# Patient Record
Sex: FEMALE | Race: BLACK OR AFRICAN AMERICAN | Employment: FULL TIME | URBAN - METROPOLITAN AREA
[De-identification: names, ages, dates, MRNs, and addresses within clinical notes are randomized per-mention and may not be internally consistent; named-entity substitution may affect disease eponyms.]

---

## 2017-07-11 PROBLEM — R73.01 IMPAIRED FASTING BLOOD SUGAR: Status: ACTIVE | Noted: 2017-07-11

## 2017-10-24 PROBLEM — E01.0 THYROMEGALY: Status: ACTIVE | Noted: 2017-10-24

## 2017-10-27 ENCOUNTER — HOSPITAL ENCOUNTER (OUTPATIENT)
Dept: ULTRASOUND IMAGING | Age: 37
Discharge: HOME | End: 2017-10-27
Attending: INTERNAL MEDICINE
Payer: COMMERCIAL

## 2017-10-27 DIAGNOSIS — E01.0 THYROMEGALY: ICD-10-CM

## 2017-10-27 PROCEDURE — 76536 US EXAM OF HEAD AND NECK: CPT

## 2017-11-02 PROBLEM — E04.1 THYROID NODULE: Status: ACTIVE | Noted: 2017-11-02

## 2017-11-08 ENCOUNTER — HOSPITAL ENCOUNTER (OUTPATIENT)
Dept: ULTRASOUND IMAGING | Age: 37
Discharge: HOME | End: 2017-11-08
Attending: INTERNAL MEDICINE
Payer: COMMERCIAL

## 2017-11-08 VITALS
SYSTOLIC BLOOD PRESSURE: 136 MMHG | RESPIRATION RATE: 20 BRPM | HEART RATE: 76 BPM | BODY MASS INDEX: 25.9 KG/M2 | DIASTOLIC BLOOD PRESSURE: 78 MMHG | WEIGHT: 165 LBS | OXYGEN SATURATION: 96 % | HEIGHT: 67 IN | TEMPERATURE: 98 F

## 2017-11-08 DIAGNOSIS — E04.1 THYROID NODULE: ICD-10-CM

## 2017-11-08 PROCEDURE — 88173 CYTOPATH EVAL FNA REPORT: CPT | Performed by: INTERNAL MEDICINE

## 2017-11-08 PROCEDURE — 10022 US GUIDE FINE NDL ASP THYROID: CPT

## 2017-11-08 PROCEDURE — 60100 BIOPSY OF THYROID: CPT

## 2017-11-08 PROCEDURE — 88305 TISSUE EXAM BY PATHOLOGIST: CPT | Performed by: INTERNAL MEDICINE

## 2017-11-08 PROCEDURE — 74011000250 HC RX REV CODE- 250: Performed by: RADIOLOGY

## 2017-11-08 RX ORDER — LIDOCAINE HYDROCHLORIDE 20 MG/ML
20-200 INJECTION, SOLUTION INFILTRATION; PERINEURAL ONCE
Status: COMPLETED | OUTPATIENT
Start: 2017-11-08 | End: 2017-11-08

## 2017-11-08 RX ADMIN — LIDOCAINE HYDROCHLORIDE 100 MG: 20 INJECTION, SOLUTION INFILTRATION; PERINEURAL at 13:21

## 2017-11-08 NOTE — IP AVS SNAPSHOT
Mike Duqueier 
 
 
 [address] [facility] [address] [facility] 
[phone] Patient: Emily Nieto MRN: [id number] Q:0/3/8131 My Medications ASK your physician about these medications Instructions Each Dose to Equal  
 Morning Noon Evening Bedtime  
 albuterol 90 mcg/actuation inhaler Commonly known as:  PROVENTIL HFA, VENTOLIN HFA, PROAIR HFA Your last dose was: Your next dose is: Take 1 Puff by inhalation every six (6) hours as needed for Wheezing. 1 Puff  
    
   
   
   
  
 azithromycin 250 mg tablet Commonly known as:  Kendra Zurdo Your last dose was: Your next dose is: Take 1 Tab by mouth See Admin Instructions for 5 days. 250 mg  
    
   
   
   
  
 budesonide-formoterol 160-4.5 mcg/actuation Hfaa Commonly known as:  SYMBICORT Your last dose was: Your next dose is: Take 2 Puffs by inhalation two (2) times a day. 2 Puff  
    
   
   
   
  
 montelukast 10 mg tablet Commonly known as:  SINGULAIR Your last dose was: Your next dose is: Take 1 Tab by mouth daily. 10 mg  
    
   
   
   
  
 norelgestromin-ethinyl estradiol 150-35 mcg/24 hr  
Commonly known as:  Daniele Rowan Your last dose was: Your next dose is:    
   
   
 1 Patch by TransDERmal route Every Saturday. 1 Patch

## 2017-11-08 NOTE — PROGRESS NOTES
Recovery period without difficulty. Pt alert and oriented and denies pain. Dressing is clean, dry, and intact. Reviewed discharge instructions with patient and mother, both verbalized understanding. [facility]. Vital signs and Royal score completed.

## 2017-11-08 NOTE — IP AVS SNAPSHOT
[address] 
[phone] Patient: Amber Dolan MRN: [id number] TQL:2/5/9229 About your hospitalization You were admitted on:  November 8, 2017 You last received care in the:  [facility]Y [facility] You were discharged on:  November 8, 2017 Why you were hospitalized Your primary diagnosis was:  Not on File Things You Need To Do (next 8 weeks) Monday Dec 04, 2017 LAB with [facility] at  9:45 AM  
Where:  [address] [place] [address] ([address] [place] [address]) Monday Dec 11, 2017 Follow Up with Eliseo Garcia MD at  8:30 AM  
Where:  [address] [place] [address] ([address] [place] [address]) Discharge Orders None A check mera indicates which time of day the medication should be taken. My Medications ASK your physician about these medications Instructions Each Dose to Equal  
 Morning Noon Evening Bedtime  
 albuterol 90 mcg/actuation inhaler Commonly known as:  PROVENTIL HFA, VENTOLIN HFA, PROAIR HFA Your last dose was: Your next dose is: Take 1 Puff by inhalation every six (6) hours as needed for Wheezing. 1 Puff  
    
   
   
   
  
 azithromycin 250 mg tablet Commonly known as:  Archani Lever Your last dose was: Your next dose is: Take 1 Tab by mouth See Admin Instructions for 5 days. 250 mg  
    
   
   
   
  
 budesonide-formoterol 160-4.5 mcg/actuation Hfaa Commonly known as:  SYMBICORT Your last dose was: Your next dose is: Take 2 Puffs by inhalation two (2) times a day. 2 Puff  
    
   
   
   
  
 montelukast 10 mg tablet Commonly known as:  SINGULAIR Your last dose was: Your next dose is: Take 1 Tab by mouth daily.   
 10 mg  
    
   
   
   
  
 norelgestromin-ethinyl estradiol 150-35 mcg/24 hr  
Commonly known as:  April Orozco Your last dose was: Your next dose is:    
   
   
 1 Patch by TransDERmal route Every Saturday. 1 Patch Discharge Instructions [address] Department of Interventional Radiology [facility] 
[phone] Office 
[phone] Fax BIOPSY DISCHARGE INSTRUCTIONS General Instructions: A biopsy is the removal of a small piece of tissue for microscopic examination or testing. Healthy tissue can be obtained for the purpose of tissue-type matching for transplants. Unhealthy tissues are more commonly biopsied to diagnose disease. Lung Biopsy: A needle lung biopsy is performed when there is a mass discovered in the lung area. The most serious risk is infection, bleeding, and pneumothorax (a collapsed lung). Signs of pneumothorax include shortness of breath, rapid heart rate, and blueness of the skin. If any of these occur, call 911. Liver Biopsy: This test helps detect cancer, infections, and the cause of an enlargement of the liver or elevated liver enzymes. It also helps to diagnose a number of liver diseases. The pain associated with the procedure may be felt in the shoulder. The risks include internal bleeding, pneumothorax, and injury to the surrounding organs. Renal Biopsy: This procedure is sometimes done to evaluate a transplanted kidney. It is also used to evaluate unexplained decrease in kidney function, blood, or protein in the urine. You may see bright red blood in the urine the first 24 hours after the test. If the bleeding lasts longer, you need to call your doctor. There is a risk of infection and bleeding into the muscle, which may cause soreness. Spinal Biopsy: This test is sometimes done in conjunction with other procedures.  Your back will be sore, as we are taking a small sample of bone, which is slightly more difficult to sample than tissue. General Biopsy: 
   A mass can grow in any area of the body, and we are taking a specimen as ordered by your doctor. The risks are the same. They include bleeding, pain, and infection. Home Care Instructions: You may resume your regular diet and medication regimen. Do not drink alcohol, drive, or make any important legal decisions in the next 24 hours. Do not lift anything heavier than a gallon of milk until the soreness goes away. You may use over the counter acetaminophen or ibuprofen for the soreness. You may apply an ice pack to the affected area for 20-30 minutes at time for the first 24 hours. After that, you may apply a heat pack. Call If: You should call your Physician and/or the Radiology Nurse if you have any questions or concerns about the biopsy site. Call if you should have increased pain, fever, redness, drainage, or bleeding more than a small spot on the bandage. Follow-Up Instructions: Please see your ordering doctor as he/she has requested. Interventional Radiology General Nurse Discharge After general anesthesia or intravenous sedation, for 24 hours or while taking prescription Narcotics: · Limit your activities · Do not drive and operate hazardous machinery · Do not make important personal or business decisions · Do  not drink alcoholic beverages · If you have not urinated within 8 hours after discharge, please contact your surgeon on call. * Please give a list of your current medications to your Primary Care Provider. * Please update this list whenever your medications are discontinued, doses are 
   changed, or new medications (including over-the-counter products) are added. * Please carry medication information at all times in case of emergency situations. These are general instructions for a healthy lifestyle: No smoking/ No tobacco products/ Avoid exposure to second hand smoke Surgeon General's Warning:  Quitting smoking now greatly reduces serious risk to your health. Obesity, smoking, and sedentary lifestyle greatly increases your risk for illness A healthy diet, regular physical exercise & weight monitoring are important for maintaining a healthy lifestyle You may be retaining fluid if you have a history of heart failure or if you experience any of the following symptoms:  Weight gain of 3 pounds or more overnight or 5 pounds in a week, increased swelling in our hands or feet or shortness of breath while lying flat in bed. Please call your doctor as soon as you notice any of these symptoms; do not wait until your next office visit. Recognize signs and symptoms of STROKE: 
F-face looks uneven A-arms unable to move or move unevenly S-speech slurred or non-existent T-time-call 911 as soon as signs and symptoms begin-DO NOT go Back to bed or wait to see if you get better-TIME IS BRAIN. To Reach UsIf you have any questions about your procedure, please call the Interventional Radiology department at [phone]. After business hours (5pm) and weekends, call the answering service at [phone] and ask for the Radiologist on call to be paged. Si tiene Preguntas acerca del procedimiento, por favor llame al departamento de Radiología Intervencional al [phone]. Después de horas de oficina (5 pm) y los fines de [place], llamar al Matt Shanks de llamadas al [phone] y pregunte por el Radiologo de [facility]. Patient Signature: 
Date: 11/8/2017 Discharging Nurse: Raymundo Amado RN Introducing [facility]! Dear Osorio Chowdhury: Thank you for requesting a [employer] account. Our records indicate that you already have an active [employer] account. You can access your account anytime at [email or web address] [email or web address] 101 Did you know that you can access your hospital and ER discharge instructions at any time in Spokeable? You can also review all of your test results from your hospital stay or ER visit. Additional Information If you have questions, please visit the Frequently Asked Questions section of the Spokeable website at [email or web address] Remember, [employer] is NOT to be used for urgent needs. For medical emergencies, dial 911. Now available from your iPhone and Android! Unresulted Labs-Please follow up with your PCP about these lab tests Order Current Status US GUIDE BX THYROID PERC NDL In process Providers Seen During Your Hospitalization Provider Specialty Primary office phone Fe Lizarraga MD Internal Medicine [phone] Your Primary Care Physician (PCP) Primary Care Physician Office Phone Office Fax Kayla Ortiz [phone] You are allergic to the following Allergen Reactions Other Medication Other (comments) Given here in hospital & face twitched & arms shake  Started w/ R pt thinks Penicillin G Hives Recent Documentation Height Weight BMI OB Status Smoking Status 1.702 m 74.8 kg 25.84 kg/m2 Having regular periods Never Smoker Emergency Contacts Name Discharge Info Relation Home Work Mobile Jareth Estrada  Parent [1] [phone] Jazmin Mccray  Spouse [3] [phone] Patient Belongings The following personal items are in your possession at time of discharge: 
                             
 
  
  
 Please provide this summary of care documentation to your next provider. Signatures-by signing, you are acknowledging that this After Visit Summary has been reviewed with you and you have received a copy. Patient Signature:  ____________________________________________________________ Date:  ____________________________________________________________  
  
Killian [place] Provider Signature:  ____________________________________________________________ Date:  ____________________________________________________________

## 2017-11-08 NOTE — DISCHARGE INSTRUCTIONS
[address]  Department of Interventional Radiology  [facility]  [phone] Office  [phone] Fax    BIOPSY DISCHARGE INSTRUCTIONS    General Instructions:     A biopsy is the removal of a small piece of tissue for microscopic examination or testing. Healthy tissue can be obtained for the purpose of tissue-type matching for transplants. Unhealthy tissues are more commonly biopsied to diagnose disease. Lung Biopsy:     A needle lung biopsy is performed when there is a mass discovered in the lung area. The most serious risk is infection, bleeding, and pneumothorax (a collapsed lung). Signs of pneumothorax include shortness of breath, rapid heart rate, and blueness of the skin. If any of these occur, call 911. Liver Biopsy: This test helps detect cancer, infections, and the cause of an enlargement of the liver or elevated liver enzymes. It also helps to diagnose a number of liver diseases. The pain associated with the procedure may be felt in the shoulder. The risks include internal bleeding, pneumothorax, and injury to the surrounding organs. Renal Biopsy: This procedure is sometimes done to evaluate a transplanted kidney. It is also used to evaluate unexplained decrease in kidney function, blood, or protein in the urine. You may see bright red blood in the urine the first 24 hours after the test. If the bleeding lasts longer, you need to call your doctor. There is a risk of infection and bleeding into the muscle, which may cause soreness. Spinal Biopsy: This test is sometimes done in conjunction with other procedures. Your back will be sore, as we are taking a small sample of bone, which is slightly more difficult to sample than tissue. General Biopsy:     A mass can grow in any area of the body, and we are taking a specimen as ordered by your doctor. The risks are the same.  They include bleeding, pain, and infection. Home Care Instructions: You may resume your regular diet and medication regimen. Do not drink alcohol, drive, or make any important legal decisions in the next 24 hours. Do not lift anything heavier than a gallon of milk until the soreness goes away. You may use over the counter acetaminophen or ibuprofen for the soreness. You may apply an ice pack to the affected area for 20-30 minutes at time for the first 24 hours. After that, you may apply a heat pack. Call If: You should call your Physician and/or the Radiology Nurse if you have any questions or concerns about the biopsy site. Call if you should have increased pain, fever, redness, drainage, or bleeding more than a small spot on the bandage. Follow-Up Instructions: Please see your ordering doctor as he/she has requested. Interventional Radiology General Nurse Discharge    After general anesthesia or intravenous sedation, for 24 hours or while taking prescription Narcotics:  · Limit your activities  · Do not drive and operate hazardous machinery  · Do not make important personal or business decisions  · Do  not drink alcoholic beverages  · If you have not urinated within 8 hours after discharge, please contact your surgeon on call. * Please give a list of your current medications to your Primary Care Provider. * Please update this list whenever your medications are discontinued, doses are     changed, or new medications (including over-the-counter products) are added. * Please carry medication information at all times in case of emergency situations. These are general instructions for a healthy lifestyle:    No smoking/ No tobacco products/ Avoid exposure to second hand smoke  Surgeon General's Warning:  Quitting smoking now greatly reduces serious risk to your health.     Obesity, smoking, and sedentary lifestyle greatly increases your risk for illness  A healthy diet, regular physical exercise & weight monitoring are important for maintaining a healthy lifestyle    You may be retaining fluid if you have a history of heart failure or if you experience any of the following symptoms:  Weight gain of 3 pounds or more overnight or 5 pounds in a week, increased swelling in our hands or feet or shortness of breath while lying flat in bed. Please call your doctor as soon as you notice any of these symptoms; do not wait until your next office visit. Recognize signs and symptoms of STROKE:  F-face looks uneven    A-arms unable to move or move unevenly    S-speech slurred or non-existent    T-time-call 911 as soon as signs and symptoms begin-DO NOT go       Back to bed or wait to see if you get better-TIME IS BRAIN. To Reach UsIf you have any questions about your procedure, please call the Interventional Radiology department at [phone]. After business hours (5pm) and weekends, call the answering service at [phone] and ask for the Radiologist on call to be paged. Si tiene Preguntas acerca del procedimiento, por favor llame al departamento de Radiología Intervencional al [phone]. Después de horas de oficina (5 pm) y los fines de [place], llamar al Juancarlos Morrison de llamadas al [phone] y pregunte por el Radiologo de [facility].           Patient Signature:  Date: 11/8/2017  Discharging Nurse: Ham Moya RN

## 2019-01-25 ENCOUNTER — HOSPITAL ENCOUNTER (OUTPATIENT)
Dept: MAMMOGRAPHY | Age: 39
Discharge: HOME | End: 2019-01-25
Attending: INTERNAL MEDICINE
Payer: COMMERCIAL

## 2019-01-25 DIAGNOSIS — Z12.39 SCREENING BREAST EXAMINATION: ICD-10-CM

## 2019-01-25 PROCEDURE — 77067 SCR MAMMO BI INCL CAD: CPT

## 2019-12-24 ENCOUNTER — HOSPITAL ENCOUNTER (OUTPATIENT)
Dept: ULTRASOUND IMAGING | Age: 39
Discharge: HOME | End: 2019-12-24
Attending: INTERNAL MEDICINE

## 2019-12-24 DIAGNOSIS — E04.1 THYROID NODULE: ICD-10-CM

## 2019-12-26 NOTE — PROGRESS NOTES
Thyroid US looks stable. Previous nodule smaller.   Will follow in 2 yrs and then can probably increase screening interval.

## 2020-03-02 ENCOUNTER — HOSPITAL ENCOUNTER (OUTPATIENT)
Dept: MRI IMAGING | Age: 40
Discharge: HOME | End: 2020-03-02
Attending: INTERNAL MEDICINE
Payer: COMMERCIAL

## 2020-03-02 DIAGNOSIS — M54.2 NECK PAIN: ICD-10-CM

## 2020-03-02 PROCEDURE — 72141 MRI NECK SPINE W/O DYE: CPT

## 2020-04-01 ENCOUNTER — HOSPITAL ENCOUNTER (OUTPATIENT)
Dept: SURGERY | Age: 40
Discharge: HOME | End: 2020-04-01
Payer: COMMERCIAL

## 2020-04-01 VITALS
BODY MASS INDEX: 25.94 KG/M2 | DIASTOLIC BLOOD PRESSURE: 69 MMHG | WEIGHT: 165.25 LBS | RESPIRATION RATE: 18 BRPM | TEMPERATURE: 98 F | HEIGHT: 67 IN | SYSTOLIC BLOOD PRESSURE: 131 MMHG | HEART RATE: 79 BPM | OXYGEN SATURATION: 99 %

## 2020-04-01 LAB
ANION GAP SERPL CALC-SCNC: 4 MMOL/L (ref 7–16)
APPEARANCE UR: CLEAR
BACTERIA SPEC CULT: NORMAL
BASOPHILS # BLD: 0 K/UL (ref 0–0.2)
BASOPHILS NFR BLD: 1 % (ref 0–2)
BILIRUB UR QL: NEGATIVE
BUN SERPL-MCNC: 9 MG/DL (ref 6–23)
CALCIUM SERPL-MCNC: 8.5 MG/DL (ref 8.3–10.4)
CHLORIDE SERPL-SCNC: 108 MMOL/L (ref 98–107)
CO2 SERPL-SCNC: 27 MMOL/L (ref 21–32)
COLOR UR: YELLOW
CREAT SERPL-MCNC: 0.71 MG/DL (ref 0.6–1)
DIFFERENTIAL METHOD BLD: ABNORMAL
EOSINOPHIL # BLD: 0.1 K/UL (ref 0–0.8)
EOSINOPHIL NFR BLD: 1 % (ref 0.5–7.8)
ERYTHROCYTE [DISTWIDTH] IN BLOOD BY AUTOMATED COUNT: 15.5 % (ref 11.9–14.6)
GLUCOSE SERPL-MCNC: 93 MG/DL (ref 65–100)
GLUCOSE UR STRIP.AUTO-MCNC: NEGATIVE MG/DL
HCT VFR BLD AUTO: 39.7 % (ref 35.8–46.3)
HGB BLD-MCNC: 12.6 G/DL (ref 11.7–15.4)
HGB UR QL STRIP: NEGATIVE
IMM GRANULOCYTES # BLD AUTO: 0 K/UL (ref 0–0.5)
IMM GRANULOCYTES NFR BLD AUTO: 0 % (ref 0–5)
KETONES UR QL STRIP.AUTO: NEGATIVE MG/DL
LEUKOCYTE ESTERASE UR QL STRIP.AUTO: NEGATIVE
LYMPHOCYTES # BLD: 2.7 K/UL (ref 0.5–4.6)
LYMPHOCYTES NFR BLD: 43 % (ref 13–44)
MCH RBC QN AUTO: 25.4 PG (ref 26.1–32.9)
MCHC RBC AUTO-ENTMCNC: 31.7 G/DL (ref 31.4–35)
MCV RBC AUTO: 80 FL (ref 79.6–97.8)
MONOCYTES # BLD: 0.4 K/UL (ref 0.1–1.3)
MONOCYTES NFR BLD: 7 % (ref 4–12)
NEUTS SEG # BLD: 3.1 K/UL (ref 1.7–8.2)
NEUTS SEG NFR BLD: 49 % (ref 43–78)
NITRITE UR QL STRIP.AUTO: NEGATIVE
NRBC # BLD: 0 K/UL (ref 0–0.2)
PH UR STRIP: 6 [PH] (ref 5–9)
PLATELET # BLD AUTO: 339 K/UL (ref 150–450)
PMV BLD AUTO: 9.1 FL (ref 9.4–12.3)
POTASSIUM SERPL-SCNC: 4.2 MMOL/L (ref 3.5–5.1)
PROT UR STRIP-MCNC: NEGATIVE MG/DL
RBC # BLD AUTO: 4.96 M/UL (ref 4.05–5.2)
SERVICE CMNT-IMP: NORMAL
SODIUM SERPL-SCNC: 139 MMOL/L (ref 136–145)
SP GR UR REFRACTOMETRY: 1.01 (ref 1–1.02)
UROBILINOGEN UR QL STRIP.AUTO: 0.2 EU/DL (ref 0.2–1)
WBC # BLD AUTO: 6.3 K/UL (ref 4.3–11.1)

## 2020-04-01 PROCEDURE — 93005 ELECTROCARDIOGRAM TRACING: CPT | Performed by: ANESTHESIOLOGY

## 2020-04-01 PROCEDURE — 80048 BASIC METABOLIC PNL TOTAL CA: CPT

## 2020-04-01 PROCEDURE — 87641 MR-STAPH DNA AMP PROBE: CPT

## 2020-04-01 PROCEDURE — 81003 URINALYSIS AUTO W/O SCOPE: CPT

## 2020-04-01 PROCEDURE — 85025 COMPLETE CBC W/AUTO DIFF WBC: CPT

## 2020-04-01 NOTE — PERIOP NOTES
Recent Results (from the past 12 hour(s))   CBC WITH AUTOMATED DIFF    Collection Time: 04/01/20  9:33 AM   Result Value Ref Range    WBC 6.3 4.3 - 11.1 K/uL    RBC 4.96 4.05 - 5.2 M/uL    HGB 12.6 11.7 - 15.4 g/dL    HCT 39.7 35.8 - 46.3 %    MCV 80.0 79.6 - 97.8 FL    MCH 25.4 (L) 26.1 - 32.9 PG    MCHC 31.7 31.4 - 35.0 g/dL    RDW 15.5 (H) 11.9 - 14.6 %    PLATELET 450 850 - 678 K/uL    MPV 9.1 (L) 9.4 - 12.3 FL    ABSOLUTE NRBC 0.00 0.0 - 0.2 K/uL    DF AUTOMATED      NEUTROPHILS 49 43 - 78 %    LYMPHOCYTES 43 13 - 44 %    MONOCYTES 7 4.0 - 12.0 %    EOSINOPHILS 1 0.5 - 7.8 %    BASOPHILS 1 0.0 - 2.0 %    IMMATURE GRANULOCYTES 0 0.0 - 5.0 %    ABS. NEUTROPHILS 3.1 1.7 - 8.2 K/UL    ABS. LYMPHOCYTES 2.7 0.5 - 4.6 K/UL    ABS. MONOCYTES 0.4 0.1 - 1.3 K/UL    ABS. EOSINOPHILS 0.1 0.0 - 0.8 K/UL    ABS. BASOPHILS 0.0 0.0 - 0.2 K/UL    ABS. IMM.  GRANS. 0.0 0.0 - 0.5 K/UL   METABOLIC PANEL, BASIC    Collection Time: 04/01/20  9:33 AM   Result Value Ref Range    Sodium 139 136 - 145 mmol/L    Potassium 4.2 3.5 - 5.1 mmol/L    Chloride 108 (H) 98 - 107 mmol/L    CO2 27 21 - 32 mmol/L    Anion gap 4 (L) 7 - 16 mmol/L    Glucose 93 65 - 100 mg/dL    BUN 9 6 - 23 MG/DL    Creatinine 0.71 0.6 - 1.0 MG/DL    GFR est AA >60 >60 ml/min/1.73m2    GFR est non-AA >60 >60 ml/min/1.73m2    Calcium 8.5 8.3 - 10.4 MG/DL   URINALYSIS W/ RFLX MICROSCOPIC    Collection Time: 04/01/20  9:33 AM   Result Value Ref Range    Color YELLOW      Appearance CLEAR      Specific gravity 1.011 1.001 - 1.023      pH (UA) 6.0 5.0 - 9.0      Protein NEGATIVE  NEG mg/dL    Glucose NEGATIVE  mg/dL    Ketone NEGATIVE  NEG mg/dL    Bilirubin NEGATIVE  NEG      Blood NEGATIVE  NEG      Urobilinogen 0.2 0.2 - 1.0 EU/dL    Nitrites NEGATIVE  NEG      Leukocyte Esterase NEGATIVE  NEG     MSSA/MRSA SC BY PCR, NASAL SWAB    Collection Time: 04/01/20  9:44 AM   Result Value Ref Range    Special Requests: NO SPECIAL REQUESTS      Culture result:        SA target not detected. A MRSA NEGATIVE, SA NEGATIVE test result does not preclude MRSA or SA nasal colonization.    EKG, 12 LEAD, INITIAL    Collection Time: 04/01/20  9:47 AM   Result Value Ref Range    Ventricular Rate 67 BPM    Atrial Rate 67 BPM    P-R Interval 152 ms    QRS Duration 78 ms    Q-T Interval 398 ms    QTC Calculation (Bezet) 420 ms    Calculated P Axis 66 degrees    Calculated R Axis 68 degrees    Calculated T Axis 39 degrees    Diagnosis       Normal sinus rhythm with sinus arrhythmia  Normal ECG  No previous ECGs available

## 2020-04-01 NOTE — PERIOP NOTES
Patient verified name, , and surgery as listed in [facility]. Patient provided medical/health information and PTA medications to the best of their ability. TYPE  CASE: III  Orders per surgeon: not received  Labs per surgeon: cbc w/diff, bmp, UA, Mrsa swab  Labs per anesthesia protocol: t&S dos  EKG: done today and acceptable per anesthesia protocol     Nasal Swab collected per MD order. Patient provided with and instructed on education handouts including Guide to Surgery, blood transfusions, pain management, and hand hygiene for the family and community, and [facility] brochure. Road to Recovery Spine surgery patient guide given. Patient viewed spine prehab video. Hibiclens and instructions given per hospital policy. Original medication prescription bottles not visualized during patient appointment. Patient teach back successful and patient demonstrates knowledge of instruction.

## 2020-04-01 NOTE — PERIOP NOTES
PLEASE CONTINUE TAKING ALL PRESCRIPTION MEDICATIONS UP TO THE DAY OF SURGERY UNLESS OTHERWISE DIRECTED BELOW. DISCONTINUE all vitamins and supplements 7 days prior to surgery. DISCONTINUE Non-Steriodal Anti-Inflammatory (NSAIDS) such as Advil and Aleve 5 days prior to surgery. Home Medications to take  the day of surgery    Acetaminophen 1000 mg   symbicort   Cetirizine (zyrtec)     Home Medications   to Hold           Comments    On the day before surgery take Acetaminophen 1000mg in the morning and at bedtime       *Visitor policy of 1 visitor per patient discussed. Please do not bring home medications with you on the day of surgery unless otherwise directed by your nurse. If you are instructed to bring home medications, please give them to your nurse as they will be administered by the nursing staff. If you have any questions, please call [place] [phone] or [address] [place] [address] [phone]. A copy of this note was provided to the patient for reference.

## 2020-04-02 LAB
ATRIAL RATE: 67 BPM
CALCULATED P AXIS, ECG09: 66 DEGREES
CALCULATED R AXIS, ECG10: 68 DEGREES
CALCULATED T AXIS, ECG11: 39 DEGREES
DIAGNOSIS, 93000: NORMAL
P-R INTERVAL, ECG05: 152 MS
Q-T INTERVAL, ECG07: 398 MS
QRS DURATION, ECG06: 78 MS
QTC CALCULATION (BEZET), ECG08: 420 MS
VENTRICULAR RATE, ECG03: 67 BPM

## 2020-04-06 NOTE — H&P
Chief complaint: Radiating neck pain. History of present illness: This is a 55-year-old patient that I had seen about 3 weeks ago with symptoms and exam findings suggestive of cervical myelopathy and confounding right-sided lumbar radiculopathy. I had recommended C4-C5 ACDF. Now, the patient returns stating that her symptoms have worsened. She has increased pressure in her neck and has been dropping objects including shampoo, her phone, and a tight going into the often. She has also experienced shocking sensations in all extremities and has noted spasming and clawing of her left hand. Her pain has increased and is 9/10 on the visual analog scale. She has already tried chiropractic care, wrist splints, carpal tunnel injections, gabapentin, and a home exercise program.      PMHx/PSHx/Social History/Medications/Allergies/ROS: are listed separately in the electronic medical record and have been reviewed. ROS:     No fever, chills, or chest pain. ????? Medications: EC-Naprosyn (500 MG, Take 1 tablet(s) by mouth 2 times a day as needed [PRN]); Montelukast Sodium (10 MG); Neurontin (100 MG, 1 q night for 3 nights, then 1 bid for 3 days then 1 TID); ProAir HFA (108 (90... MCG/ACT); Symbicort (160-4.5. Clenton Reas Clenton Reas MCG/ACT); Xulane (150-35 . .. MCG/24HR)  ? ???? Allergies: Penicillin  ?????    Physical Exam: This is a well developed well nourished adult female in no acute distress. Mood and affect are appropriate. Oriented to person, place, and time. Head is normocephalic and atraumatic. Extraocular movements intact. Sclera anicteric. Respirations are unlabored and there is no evidence of cyanosis. Chest is clear to auscultation. Heart is regular rate and rhythm. Abdomen is soft. There is subjective light touch sensory loss over the bilateral hands in a glovelike pattern in the right posterior thigh and lateral shin. Spurlings is positive.     The patient ambulates with a normal gait.    Deep tendon reflex testing in the upper extremity reveals the following. Right Left   Biceps (C5) 3 3   Brachio radialis (C6) 3 3    Triceps (C7) 3 3                 Covingtons is positive. Ankle jerk is positive. Inverted radial reflex is positive. Strength testing in the upper extremity reveals the following based on the 5 point grading scale:     Delt(C5) Bicep(C6) WE(C6) Tricep (C7) WF(C7) (C8) Int (T1)   Right 5 5 5 5 4 4 4   Left 5 5 5 5 4 4 4     Pulses are palpable over bilateral radial arteries. Radiographic Studies:    X-rays and MRI were again independently reviewed and correlate with the patients symptoms. Assessment/Plan: This patients clinical history and physical exam suggestive of cervical myelopathy. I believe she has confounding right-sided lumbar radiculopathy. The imaging studies are concordant with the patients symptoms. Her symptoms have worsened over the last couple of weeks. Conservative efforts have been reasonably exhausted and the patient feels like she cannot go on with the symptoms as they are. We discussed that she may have a double crush phenomenon or other contributing factors to her symptoms. However, the cervical stenosis with cord compression is an overlying pathology that will make any of the other pathologies difficult to differentiate. We have previously discussed surgical options and now she would like to proceed with surgical scheduling.    ????? We discussed the details of the surgery including an incision over the left side of the front of the neck. The windpipe and foodpipe would be retracted to the side to expose the underlying spine. The appropriate disc would be identified with an X-ray and the disc would be removed. The nerves would be freed by trimming any impinging structures such as bone spurs and disc material.   The disc space would then be filled with a spacer and bone graft from a cadaver.  A metal plate may be applied to augment the structure. A drain may be placed, and then the wound would be closed with suture and covered with sterile dressings. She would expect to stay in recovery for observation or in the hospital overnight depending on how quickly she recovers. Follow-up would be scheduled for 2-3 weeks and she would have restrictions including no driving for 2 weeks, no lifting greater than 15 lbs. We also discussed the potential risks of the surgery including, but not limited to infection, spinal fluid leak, compressive hematoma; injury to spinal cord or peripheral nerve root resulting in paralysis, or loss of use of an extremity; persistent neck or arm symptoms or pain at the bone graft site; failure of the bone graft to heal or failure of the hardware resulting in the possibility of needing additional surgery; postoperative hoarseness or dysphagia; injury to an artery or vein resulting in significant blood loss; and the risks of anesthesia including, but not limited heart attack, stroke, blood clot or death. The patient was also given a brochure on anterior cervical discectomy and fusion. The patient voiced an understanding of these issues outlined. The procedure that I think may be beneficial here is an anterior cervical discectomy and fusion with interbody spacer, allograft and instrumentation from C4-5.             Electronically Signed By Blair Feliz MD

## 2020-04-07 ENCOUNTER — ANESTHESIA EVENT (OUTPATIENT)
Dept: SURGERY | Age: 40
End: 2020-04-07
Payer: COMMERCIAL

## 2020-04-07 RX ORDER — SODIUM CHLORIDE, SODIUM LACTATE, POTASSIUM CHLORIDE, CALCIUM CHLORIDE 600; 310; 30; 20 MG/100ML; MG/100ML; MG/100ML; MG/100ML
125 INJECTION, SOLUTION INTRAVENOUS CONTINUOUS
Status: CANCELLED | OUTPATIENT
Start: 2020-04-07

## 2020-04-07 RX ORDER — HYDROMORPHONE HYDROCHLORIDE 2 MG/ML
0.5 INJECTION, SOLUTION INTRAMUSCULAR; INTRAVENOUS; SUBCUTANEOUS
Status: CANCELLED | OUTPATIENT
Start: 2020-04-07

## 2020-04-07 RX ORDER — NALOXONE HYDROCHLORIDE 0.4 MG/ML
0.1 INJECTION, SOLUTION INTRAMUSCULAR; INTRAVENOUS; SUBCUTANEOUS AS NEEDED
Status: CANCELLED | OUTPATIENT
Start: 2020-04-07

## 2020-04-07 RX ORDER — CEFAZOLIN SODIUM/WATER 2 G/20 ML
2 SYRINGE (ML) INTRAVENOUS ONCE
Status: CANCELLED | OUTPATIENT
Start: 2020-04-07 | End: 2020-04-07

## 2020-04-07 RX ORDER — OXYCODONE HYDROCHLORIDE 5 MG/1
10 TABLET ORAL
Status: CANCELLED | OUTPATIENT
Start: 2020-04-07 | End: 2020-04-08

## 2020-04-07 NOTE — ANESTHESIA PREPROCEDURE EVALUATION
Relevant Problems   No relevant active problems       Anesthetic History               Review of Systems / Medical History  Patient summary reviewed, nursing notes reviewed and pertinent labs reviewed    Pulmonary            Asthma        Neuro/Psych              Cardiovascular                  Exercise tolerance: >4 METS     GI/Hepatic/Renal                Endo/Other      Hypothyroidism  Obesity     Other Findings            Physical Exam    Airway  Mallampati: II  TM Distance: > 6 cm  Neck ROM: normal range of motion   Mouth opening: Normal     Cardiovascular    Rhythm: regular           Dental    Dentition: Caps/crowns     Pulmonary                 Abdominal  GI exam deferred       Other Findings            Anesthetic Plan    ASA: 2  Anesthesia type: general          Induction: Intravenous  Anesthetic plan and risks discussed with: Patient

## 2020-04-08 ENCOUNTER — APPOINTMENT (OUTPATIENT)
Dept: GENERAL RADIOLOGY | Age: 40
End: 2020-04-08
Attending: ORTHOPAEDIC SURGERY
Payer: COMMERCIAL

## 2020-04-08 ENCOUNTER — ANESTHESIA (OUTPATIENT)
Dept: SURGERY | Age: 40
End: 2020-04-08
Payer: COMMERCIAL

## 2020-04-08 ENCOUNTER — HOSPITAL ENCOUNTER (OUTPATIENT)
Age: 40
Setting detail: OUTPATIENT SURGERY
Discharge: HOME | End: 2020-04-08
Attending: ORTHOPAEDIC SURGERY | Admitting: ORTHOPAEDIC SURGERY
Payer: COMMERCIAL

## 2020-04-08 VITALS
SYSTOLIC BLOOD PRESSURE: 150 MMHG | WEIGHT: 170 LBS | TEMPERATURE: 98 F | DIASTOLIC BLOOD PRESSURE: 94 MMHG | RESPIRATION RATE: 14 BRPM | BODY MASS INDEX: 26.63 KG/M2 | OXYGEN SATURATION: 94 % | HEART RATE: 60 BPM

## 2020-04-08 DIAGNOSIS — M48.02 CERVICAL SPINAL STENOSIS: Primary | ICD-10-CM

## 2020-04-08 PROBLEM — G95.9 CERVICAL MYELOPATHY (HCC): Status: ACTIVE | Noted: 2020-04-08

## 2020-04-08 LAB
ABO + RH BLD: NORMAL
BLOOD GROUP ANTIBODIES SERPL: NORMAL
HCG UR QL: NEGATIVE
SPECIMEN EXP DATE BLD: NORMAL

## 2020-04-08 PROCEDURE — C1713 ANCHOR/SCREW BN/BN,TIS/BN: HCPCS | Performed by: ORTHOPAEDIC SURGERY

## 2020-04-08 PROCEDURE — 77030039425 HC BLD LARYNG TRULITE DISP TELE -A: Performed by: ANESTHESIOLOGY

## 2020-04-08 PROCEDURE — 74011000250 HC RX REV CODE- 250: Performed by: ORTHOPAEDIC SURGERY

## 2020-04-08 PROCEDURE — 77030010507 HC ADH SKN DERMBND J&J -B: Performed by: ORTHOPAEDIC SURGERY

## 2020-04-08 PROCEDURE — 74011250636 HC RX REV CODE- 250/636: Performed by: NURSE ANESTHETIST, CERTIFIED REGISTERED

## 2020-04-08 PROCEDURE — 77030037088 HC TUBE ENDOTRACH ORAL NSL COVD-A: Performed by: ANESTHESIOLOGY

## 2020-04-08 PROCEDURE — 77030003860 HC BIT DRL STRY -B: Performed by: ORTHOPAEDIC SURGERY

## 2020-04-08 PROCEDURE — 77030028270 HC SRGFL HEMSTAT MTRX J&J -C: Performed by: ORTHOPAEDIC SURGERY

## 2020-04-08 PROCEDURE — 74011000250 HC RX REV CODE- 250: Performed by: NURSE ANESTHETIST, CERTIFIED REGISTERED

## 2020-04-08 PROCEDURE — 77030040361 HC SLV COMPR DVT MDII -B: Performed by: ORTHOPAEDIC SURGERY

## 2020-04-08 PROCEDURE — 72020 X-RAY EXAM OF SPINE 1 VIEW: CPT

## 2020-04-08 PROCEDURE — 77030012894: Performed by: ORTHOPAEDIC SURGERY

## 2020-04-08 PROCEDURE — 76060000033 HC ANESTHESIA 1 TO 1.5 HR: Performed by: ORTHOPAEDIC SURGERY

## 2020-04-08 PROCEDURE — 77030018673: Performed by: ORTHOPAEDIC SURGERY

## 2020-04-08 PROCEDURE — 77030011265 HC ELECTRD BLD HEX COVD -A: Performed by: ORTHOPAEDIC SURGERY

## 2020-04-08 PROCEDURE — 77030025623 HC BUR RND PRECIS STRY -D: Performed by: ORTHOPAEDIC SURGERY

## 2020-04-08 PROCEDURE — 77030034475 HC MISC IMPL SPN: Performed by: ORTHOPAEDIC SURGERY

## 2020-04-08 PROCEDURE — 86900 BLOOD TYPING SEROLOGIC ABO: CPT

## 2020-04-08 PROCEDURE — 77030019908 HC STETH ESOPH SIMS -A: Performed by: ANESTHESIOLOGY

## 2020-04-08 PROCEDURE — 76010000161 HC OR TIME 1 TO 1.5 HR INTENSV-TIER 1: Performed by: ORTHOPAEDIC SURGERY

## 2020-04-08 PROCEDURE — 77030039155 HC CGE SPN ANT CERV TRITANIUM STRY -G: Performed by: ORTHOPAEDIC SURGERY

## 2020-04-08 PROCEDURE — 74011250636 HC RX REV CODE- 250/636: Performed by: ORTHOPAEDIC SURGERY

## 2020-04-08 PROCEDURE — 77030018836 HC SOL IRR NACL ICUM -A: Performed by: ORTHOPAEDIC SURGERY

## 2020-04-08 PROCEDURE — 77030003666 HC NDL SPINAL BD -A: Performed by: ORTHOPAEDIC SURGERY

## 2020-04-08 PROCEDURE — 74011250636 HC RX REV CODE- 250/636: Performed by: ANESTHESIOLOGY

## 2020-04-08 PROCEDURE — 81025 URINE PREGNANCY TEST: CPT

## 2020-04-08 PROCEDURE — 74011250637 HC RX REV CODE- 250/637: Performed by: ORTHOPAEDIC SURGERY

## 2020-04-08 PROCEDURE — 77030031139 HC SUT VCRL2 J&J -A: Performed by: ORTHOPAEDIC SURGERY

## 2020-04-08 PROCEDURE — 76210000021 HC REC RM PH II 0.5 TO 1 HR: Performed by: ORTHOPAEDIC SURGERY

## 2020-04-08 PROCEDURE — 77030029099 HC BN WAX SSPC -A: Performed by: ORTHOPAEDIC SURGERY

## 2020-04-08 PROCEDURE — 74011250637 HC RX REV CODE- 250/637: Performed by: ANESTHESIOLOGY

## 2020-04-08 PROCEDURE — 76210000006 HC OR PH I REC 0.5 TO 1 HR: Performed by: ORTHOPAEDIC SURGERY

## 2020-04-08 DEVICE — BIO DBM PUTTY
Type: IMPLANTABLE DEVICE | Site: SPINE CERVICAL | Status: FUNCTIONAL
Brand: BIO DBM

## 2020-04-08 DEVICE — ONE-LEVEL ANTERIOR PLATE
Type: IMPLANTABLE DEVICE | Site: SPINE CERVICAL | Status: FUNCTIONAL
Brand: AVIATOR

## 2020-04-08 DEVICE — ANTERIOR CERVICAL CAGE
Type: IMPLANTABLE DEVICE | Site: SPINE CERVICAL | Status: FUNCTIONAL
Brand: TRITANIUM C

## 2020-04-08 DEVICE — VARIABLE, SELF TAPPING SCREW
Type: IMPLANTABLE DEVICE | Site: SPINE CERVICAL | Status: FUNCTIONAL
Brand: AVIATOR

## 2020-04-08 RX ORDER — ROCURONIUM BROMIDE 10 MG/ML
INJECTION, SOLUTION INTRAVENOUS AS NEEDED
Status: DISCONTINUED | OUTPATIENT
Start: 2020-04-08 | End: 2020-04-08 | Stop reason: HOSPADM

## 2020-04-08 RX ORDER — OXYCODONE HYDROCHLORIDE 5 MG/1
5 TABLET ORAL
Qty: 30 TAB | Refills: 0 | Status: SHIPPED | OUTPATIENT
Start: 2020-04-08 | End: 2020-04-13

## 2020-04-08 RX ORDER — LIDOCAINE HYDROCHLORIDE 10 MG/ML
0.1 INJECTION INFILTRATION; PERINEURAL AS NEEDED
Status: DISCONTINUED | OUTPATIENT
Start: 2020-04-08 | End: 2020-04-08 | Stop reason: HOSPADM

## 2020-04-08 RX ORDER — DEXAMETHASONE SODIUM PHOSPHATE 4 MG/ML
INJECTION, SOLUTION INTRA-ARTICULAR; INTRALESIONAL; INTRAMUSCULAR; INTRAVENOUS; SOFT TISSUE AS NEEDED
Status: DISCONTINUED | OUTPATIENT
Start: 2020-04-08 | End: 2020-04-08 | Stop reason: HOSPADM

## 2020-04-08 RX ORDER — CEFAZOLIN SODIUM/WATER 2 G/20 ML
SYRINGE (ML) INTRAVENOUS AS NEEDED
Status: DISCONTINUED | OUTPATIENT
Start: 2020-04-08 | End: 2020-04-08 | Stop reason: HOSPADM

## 2020-04-08 RX ORDER — PROPOFOL 10 MG/ML
INJECTION, EMULSION INTRAVENOUS AS NEEDED
Status: DISCONTINUED | OUTPATIENT
Start: 2020-04-08 | End: 2020-04-08 | Stop reason: HOSPADM

## 2020-04-08 RX ORDER — HYDROMORPHONE HYDROCHLORIDE 2 MG/ML
0.5 INJECTION, SOLUTION INTRAMUSCULAR; INTRAVENOUS; SUBCUTANEOUS
Status: DISCONTINUED | OUTPATIENT
Start: 2020-04-08 | End: 2020-04-08 | Stop reason: HOSPADM

## 2020-04-08 RX ORDER — ACETAMINOPHEN 500 MG
1000 TABLET ORAL ONCE
Status: DISCONTINUED | OUTPATIENT
Start: 2020-04-08 | End: 2020-04-08 | Stop reason: HOSPADM

## 2020-04-08 RX ORDER — SODIUM CHLORIDE 0.9 % (FLUSH) 0.9 %
5-40 SYRINGE (ML) INJECTION AS NEEDED
Status: DISCONTINUED | OUTPATIENT
Start: 2020-04-08 | End: 2020-04-08 | Stop reason: HOSPADM

## 2020-04-08 RX ORDER — MIDAZOLAM HYDROCHLORIDE 1 MG/ML
2 INJECTION, SOLUTION INTRAMUSCULAR; INTRAVENOUS
Status: DISCONTINUED | OUTPATIENT
Start: 2020-04-08 | End: 2020-04-08 | Stop reason: HOSPADM

## 2020-04-08 RX ORDER — LABETALOL HYDROCHLORIDE 5 MG/ML
INJECTION, SOLUTION INTRAVENOUS AS NEEDED
Status: DISCONTINUED | OUTPATIENT
Start: 2020-04-08 | End: 2020-04-08 | Stop reason: HOSPADM

## 2020-04-08 RX ORDER — METOPROLOL TARTRATE 5 MG/5ML
INJECTION INTRAVENOUS AS NEEDED
Status: DISCONTINUED | OUTPATIENT
Start: 2020-04-08 | End: 2020-04-08 | Stop reason: HOSPADM

## 2020-04-08 RX ORDER — CEFAZOLIN SODIUM/WATER 2 G/20 ML
2 SYRINGE (ML) INTRAVENOUS ONCE
Status: DISCONTINUED | OUTPATIENT
Start: 2020-04-08 | End: 2020-04-08 | Stop reason: HOSPADM

## 2020-04-08 RX ORDER — NEOSTIGMINE METHYLSULFATE 1 MG/ML
INJECTION, SOLUTION INTRAVENOUS AS NEEDED
Status: DISCONTINUED | OUTPATIENT
Start: 2020-04-08 | End: 2020-04-08 | Stop reason: HOSPADM

## 2020-04-08 RX ORDER — ACETAMINOPHEN 500 MG
1000 TABLET ORAL
COMMUNITY

## 2020-04-08 RX ORDER — ONDANSETRON 2 MG/ML
INJECTION INTRAMUSCULAR; INTRAVENOUS AS NEEDED
Status: DISCONTINUED | OUTPATIENT
Start: 2020-04-08 | End: 2020-04-08 | Stop reason: HOSPADM

## 2020-04-08 RX ORDER — SODIUM CHLORIDE 0.9 % (FLUSH) 0.9 %
5-40 SYRINGE (ML) INJECTION EVERY 8 HOURS
Status: DISCONTINUED | OUTPATIENT
Start: 2020-04-08 | End: 2020-04-08 | Stop reason: HOSPADM

## 2020-04-08 RX ORDER — SODIUM CHLORIDE, SODIUM LACTATE, POTASSIUM CHLORIDE, CALCIUM CHLORIDE 600; 310; 30; 20 MG/100ML; MG/100ML; MG/100ML; MG/100ML
75 INJECTION, SOLUTION INTRAVENOUS CONTINUOUS
Status: DISCONTINUED | OUTPATIENT
Start: 2020-04-08 | End: 2020-04-08 | Stop reason: HOSPADM

## 2020-04-08 RX ORDER — FENTANYL CITRATE 50 UG/ML
INJECTION, SOLUTION INTRAMUSCULAR; INTRAVENOUS AS NEEDED
Status: DISCONTINUED | OUTPATIENT
Start: 2020-04-08 | End: 2020-04-08 | Stop reason: HOSPADM

## 2020-04-08 RX ORDER — MIDAZOLAM HYDROCHLORIDE 1 MG/ML
2 INJECTION, SOLUTION INTRAMUSCULAR; INTRAVENOUS
Status: COMPLETED | OUTPATIENT
Start: 2020-04-08 | End: 2020-04-08

## 2020-04-08 RX ORDER — OXYCODONE HYDROCHLORIDE 5 MG/1
5 TABLET ORAL
Status: DISCONTINUED | OUTPATIENT
Start: 2020-04-08 | End: 2020-04-08 | Stop reason: HOSPADM

## 2020-04-08 RX ORDER — LIDOCAINE HYDROCHLORIDE 20 MG/ML
INJECTION, SOLUTION EPIDURAL; INFILTRATION; INTRACAUDAL; PERINEURAL AS NEEDED
Status: DISCONTINUED | OUTPATIENT
Start: 2020-04-08 | End: 2020-04-08 | Stop reason: HOSPADM

## 2020-04-08 RX ORDER — ACETAMINOPHEN 500 MG
1000 TABLET ORAL ONCE
Status: DISCONTINUED | OUTPATIENT
Start: 2020-04-08 | End: 2020-04-08 | Stop reason: SDUPTHER

## 2020-04-08 RX ORDER — SODIUM CHLORIDE, SODIUM LACTATE, POTASSIUM CHLORIDE, CALCIUM CHLORIDE 600; 310; 30; 20 MG/100ML; MG/100ML; MG/100ML; MG/100ML
125 INJECTION, SOLUTION INTRAVENOUS CONTINUOUS
Status: DISCONTINUED | OUTPATIENT
Start: 2020-04-08 | End: 2020-04-08 | Stop reason: HOSPADM

## 2020-04-08 RX ORDER — OXYCODONE AND ACETAMINOPHEN 10; 325 MG/1; MG/1
1 TABLET ORAL AS NEEDED
Status: DISCONTINUED | OUTPATIENT
Start: 2020-04-08 | End: 2020-04-08 | Stop reason: HOSPADM

## 2020-04-08 RX ORDER — GLYCOPYRROLATE 0.2 MG/ML
INJECTION INTRAMUSCULAR; INTRAVENOUS AS NEEDED
Status: DISCONTINUED | OUTPATIENT
Start: 2020-04-08 | End: 2020-04-08 | Stop reason: HOSPADM

## 2020-04-08 RX ADMIN — ONDANSETRON 4 MG: 2 INJECTION INTRAMUSCULAR; INTRAVENOUS at 08:14

## 2020-04-08 RX ADMIN — ROCURONIUM BROMIDE 10 MG: 10 INJECTION, SOLUTION INTRAVENOUS at 07:30

## 2020-04-08 RX ADMIN — GLYCOPYRROLATE 0.4 MG: 0.2 INJECTION, SOLUTION INTRAMUSCULAR; INTRAVENOUS at 08:17

## 2020-04-08 RX ADMIN — FENTANYL CITRATE 50 MCG: 50 INJECTION INTRAMUSCULAR; INTRAVENOUS at 07:40

## 2020-04-08 RX ADMIN — FENTANYL CITRATE 50 MCG: 50 INJECTION INTRAMUSCULAR; INTRAVENOUS at 07:33

## 2020-04-08 RX ADMIN — ROCURONIUM BROMIDE 40 MG: 10 INJECTION, SOLUTION INTRAVENOUS at 07:21

## 2020-04-08 RX ADMIN — Medication 3 AMPULE: at 06:46

## 2020-04-08 RX ADMIN — DEXAMETHASONE SODIUM PHOSPHATE 4 MG: 4 INJECTION, SOLUTION INTRAMUSCULAR; INTRAVENOUS at 07:51

## 2020-04-08 RX ADMIN — METOPROLOL TARTRATE 1 MG: 1 INJECTION, SOLUTION INTRAVENOUS at 08:02

## 2020-04-08 RX ADMIN — OXYCODONE HYDROCHLORIDE AND ACETAMINOPHEN 1 TABLET: 10; 325 TABLET ORAL at 08:49

## 2020-04-08 RX ADMIN — Medication 4 MG: at 08:17

## 2020-04-08 RX ADMIN — LIDOCAINE HYDROCHLORIDE 100 MG: 20 INJECTION, SOLUTION EPIDURAL; INFILTRATION; INTRACAUDAL; PERINEURAL at 07:21

## 2020-04-08 RX ADMIN — FENTANYL CITRATE 100 MCG: 50 INJECTION INTRAMUSCULAR; INTRAVENOUS at 07:44

## 2020-04-08 RX ADMIN — FENTANYL CITRATE 50 MCG: 50 INJECTION INTRAMUSCULAR; INTRAVENOUS at 07:21

## 2020-04-08 RX ADMIN — LABETALOL HYDROCHLORIDE 5 MG: 5 INJECTION INTRAVENOUS at 07:42

## 2020-04-08 RX ADMIN — SODIUM CHLORIDE, SODIUM LACTATE, POTASSIUM CHLORIDE, AND CALCIUM CHLORIDE 125 ML/HR: 600; 310; 30; 20 INJECTION, SOLUTION INTRAVENOUS at 06:46

## 2020-04-08 RX ADMIN — Medication 2 G: at 07:30

## 2020-04-08 RX ADMIN — LABETALOL HYDROCHLORIDE 5 MG: 5 INJECTION INTRAVENOUS at 07:33

## 2020-04-08 RX ADMIN — FENTANYL CITRATE 50 MCG: 50 INJECTION INTRAMUSCULAR; INTRAVENOUS at 07:28

## 2020-04-08 RX ADMIN — PROPOFOL 200 MG: 10 INJECTION, EMULSION INTRAVENOUS at 07:21

## 2020-04-08 RX ADMIN — MIDAZOLAM 2 MG: 1 INJECTION INTRAMUSCULAR; INTRAVENOUS at 07:02

## 2020-04-08 NOTE — ANESTHESIA POSTPROCEDURE EVALUATION
Procedure(s):  C4-C5  ANTERIOR CERVICAL DISCECTOMY AND FUSION WITH INTERBODY SPACER, ALLOGRAFT AND INSTRUMENTATION.     general    Anesthesia Post Evaluation      Multimodal analgesia: multimodal analgesia used between 6 hours prior to anesthesia start to PACU discharge  Patient location during evaluation: PACU  Patient participation: complete - patient participated  Level of consciousness: awake  Pain management: adequate  Airway patency: patent  Anesthetic complications: no  Cardiovascular status: acceptable  Respiratory status: acceptable  Hydration status: acceptable  Post anesthesia nausea and vomiting:  none      Vitals Value Taken Time   /94 4/8/2020  9:10 AM   Temp 36.7 °C (98 °F) 4/8/2020  9:10 AM   Pulse 60 4/8/2020  9:11 AM   Resp 14 4/8/2020  9:11 AM   SpO2 94 % 4/8/2020  9:11 AM

## 2020-04-08 NOTE — PROGRESS NOTES
's Pre-op visit requested by patient. Conveyed care and concern for patient and family. Offered prayer as requested for patient, family, and staff.     Indigo Aguilar MDiv, BS  Board Certified

## 2020-04-08 NOTE — DISCHARGE INSTRUCTIONS
After general anesthesia or intravenous sedation, for 24 hours or while taking prescription Narcotics:  · Limit your activities  · A responsible adult needs to be with you for the next 24 hours  · Do not drive and operate hazardous machinery  · Do not make important personal or business decisions  · Do not drink alcoholic beverages  · If you have not urinated within 8 hours after discharge, please contact your surgeon on call. · If you have sleep apnea and have a CPAP machine, please use it for all naps and sleeping. · Please use caution when taking narcotics and any of your home medications that may cause drowsiness. *  Please give a list of your current medications to your Primary Care Provider. *  Please update this list whenever your medications are discontinued, doses are      changed, or new medications (including over-the-counter products) are added. *  Please carry medication information at all times in case of emergency situations. These are general instructions for a healthy lifestyle:  No smoking/ No tobacco products/ Avoid exposure to second hand smoke  Surgeon General's Warning:  Quitting smoking now greatly reduces serious risk to your health. Obesity, smoking, and sedentary lifestyle greatly increases your risk for illness  A healthy diet, regular physical exercise & weight monitoring are important for maintaining a healthy lifestyle    You may be retaining fluid if you have a history of heart failure or if you experience any of the following symptoms:  Weight gain of 3 pounds or more overnight or 5 pounds in a week, increased swelling in our hands or feet or shortness of breath while lying flat in bed. Please call your doctor as soon as you notice any of these symptoms; do not wait until your next office visit.             After general anesthesia or intravenous sedation, for 24 hours or while taking prescription Narcotics:  · Limit your activities  · A responsible adult needs to be with you for the next 24 hours  · Do not drive and operate hazardous machinery  · Do not make important personal or business decisions  · Do not drink alcoholic beverages  · If you have not urinated within 8 hours after discharge, please contact your surgeon on call. · If you have sleep apnea and have a CPAP machine, please use it for all naps and sleeping. · Please use caution when taking narcotics and any of your home medications that may cause drowsiness. *  Please give a list of your current medications to your Primary Care Provider. *  Please update this list whenever your medications are discontinued, doses are      changed, or new medications (including over-the-counter products) are added. *  Please carry medication information at all times in case of emergency situations. These are general instructions for a healthy lifestyle:  No smoking/ No tobacco products/ Avoid exposure to second hand smoke  Surgeon General's Warning:  Quitting smoking now greatly reduces serious risk to your health. Obesity, smoking, and sedentary lifestyle greatly increases your risk for illness  A healthy diet, regular physical exercise & weight monitoring are important for maintaining a healthy lifestyle    You may be retaining fluid if you have a history of heart failure or if you experience any of the following symptoms:  Weight gain of 3 pounds or more overnight or 5 pounds in a week, increased swelling in our hands or feet or shortness of breath while lying flat in bed. Please call your doctor as soon as you notice any of these symptoms; do not wait until your next office visit.

## 2020-04-08 NOTE — OP NOTES
[address] [address]   [phone]    OPERATIVE REPORT  Patient Yesica Corea  [id number]  1980  36 y.o. DATE OF SURGERY: 4/8/2020    SURGEON: Miriam Maharaj M.D. PREOPERATIVE DIAGNOSIS:  C4 - C5 stenosis. POSTOPERATIVE DIAGNOSIS:  C4 - C5 stenosis. PROCEDURE:     1. Anterior cervical diskectomy and fusion C4 - C5.  (CPT 43123)     2. Anterior cervical instrumentation  C4 - C5.  (CPT 46424)     3. Insertion biomechanical device  C4 - C5 (CPT 22853 X 1)    ANESTHESIA:  General.    ESTIMATED BLOOD LOSS:  10 cc    INTRAOPERATIVE COMPLICATIONS:  None. POSTOPERATIVE CONDITION:  Stable. IMPLANTS:   Implant Name Type Inv. Item Serial No.  Lot No. LRB No. Used Action   Stemgent    [id number] Kampyle  N/A 1 Implanted   GRAFT BNE DBM PTTY W/CHIPS 1ML -- BIO DBM - [id number]  GRAFT BNE DBM PTTY W/CHIPS 1ML -- BIO DBM  ROSETTA SPINE HOWM [id number] N/A 1 Implanted   CAGE ANTR CERV [id number] STRL -- TRITANIUM-C - [id number]  CAGE ANTR CERV [id number] STRL -- TRITANIUM-C  ROSETTA SPINE HOWM [id number] N/A 1 Implanted   PLATE ANTR CERV 1 LVL SZ 12MM -- AVIATOR - [id number]  PLATE ANTR CERV 1 LVL SZ 12MM -- AVIATOR  ROSETTA SPINE HOWM [id number] N/A 1 Implanted   SCR BNE VA ST 4X12MM -- AVIATOR - [id number]  SCR BNE VA ST 4X12MM -- AVIATOR  ROSETTA SPINE HOWM [id number] N/A 4 Implanted       INDICATIONS FOR PROCEDURE:  The patient has had persistent symptoms of cervical radiculopathy despite conservative treatment. The preoperative studies confirmed a concordant stenotic lesion resulting in neural inpingement. The risks, benefits and potential complications of the above listed procedures were discussed with the patient in detail and an informed consent was obtained. DESCRIPTION OF PROCEDURE:  After adequate induction of general anesthesia  the patient was positioned supine on the operating table.   A shoulder roll was placed and the shoulders were taped caudally to facilitate intraoperative radiographic imaging. The neck was kept in a neutral position. Care was taken to pad all bony prominences. Preoperative antibiotics were given. The neck was prepped and draped in the usual sterile fashion. A time-out was called to confirm appropriate patient, proposed procedure and proposed incision site. With this confirmation an incision was created over the left anterior lateral aspect of the neck centered near the cricoid cartilage. Dissection was carried down through the platysma using electrocautery. A Burris-Diaz approach was then performed down to the anterior cervical spine. A spinal needle was inserted in an interspace and a cross-table lateral fluoroscopic image was obtained. The appropriate level was marked with electrocautery and the spinal needle was removed. At this point the longus colli was elevated around the periphery of the appropriate disk space and [place] retractors were  inserted beneath the longus colli. [place] pins were then inserted in the C4 and C5 vertebral bodies and distraction applied across the annulus fibrosus. The operating microscope was draped and brought to the sterile field. An annulotomy was performed with a 15 blade and a complete diskectomy was performed using pituitary and 3 mm Kerrison. The diskectomy was carried out to the lateral border of the uncovertebral joints bilaterally. A 4 mm bur was then used to trim the anterior osteophytes as well as flatten the vertebral endplates in preparation for arthrodesis. The anterior aspect of the uncovertebral joints were also resected using the bur. The posterior portions of the uncovertebral joints were taken down with a 2 mm Kerrison. An interval was developed in the posterior longitudinal ligament and annulus fibrosus with a micro nerve hook.   A 2 mm Kerrison was then used to resect these structures out to the lateral border of the uncal vertebral joints bilaterally. A ball tipped nerve hook was used to palpate laterally and confirm no residual nerve root or spinal cord impingement. This was felt to be satisfactory bilaterally. The interbody sizing system was brought to the field and a size 6  lordotic spacer fit very nicely. The appropriate size spacer was selected and filled with allograft and impacted with a tamp and mallet after the wound was liberally irrigated. The anterior cervical plating system was brought to the field and an appropriate size plate was selected and applied across  C4 - C5. The peripheral screw holes were drilled and filled appropriate length screws. The locking mechanism was tightened. C-arm fluoroscopy was brought in and used to obtain AP and lateral images, both of which were felt to be satisfactory for appropriate spinal level, graft and hardware placement. The wound was liberally irrigated. The incision and the incision was closed in a layered fashion. Benzoin and Steri-Strips were applied. Sterile dressings were applied. The patient tolerated the procedure well and was returned to the post anesthesia care unit in stable condition.      Kya Razo MD

## 2020-07-27 PROBLEM — E01.0 THYROMEGALY: Status: RESOLVED | Noted: 2017-10-24 | Resolved: 2020-07-27

## 2020-07-27 PROBLEM — I82.91 CHRONIC DEEP VEIN THROMBOSIS (DVT) OF NON-EXTREMITY VEIN: Status: ACTIVE | Noted: 2020-07-27

## 2020-08-11 ENCOUNTER — HOSPITAL ENCOUNTER (OUTPATIENT)
Dept: PHYSICAL THERAPY | Age: 40
Discharge: HOME | End: 2020-08-11
Payer: COMMERCIAL

## 2020-08-11 PROCEDURE — 97162 PT EVAL MOD COMPLEX 30 MIN: CPT

## 2020-08-11 PROCEDURE — 97110 THERAPEUTIC EXERCISES: CPT

## 2020-08-11 NOTE — PROGRESS NOTES
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 Left hand still goes to sleep. Neck is really stiff  Pain: Initial:   2-9 Post Session:  No complaints   Medications Last Reviewed:  20    Updated Objective Findings:   See evaluation     TREATMENT:   Therapeutic Exercise: (10 Minutes):  Exercises to improve mobility, strength and posture. Required moderate verbal and demonstration cues to promote proper body posture and exercise technique. Reviewed and issued HEP with chin tucks, scapular retraction, B shoulder ER with tband, seated belly press IR, active B cervical rotation, left levator scap stretch with hand behind head    Treatment/Session Summary:    · Response to Treatment:  Good return demonstration of exercise after instruction. · Communication/Consultation:  None today  · Equipment provided today:  None today  · Recommendations/Intent for next treatment session: Next visit will focus on HEP upgrade, left upper limb nerve glides, left subscap release.     Total Treatment Billable Duration:  10 minutes with evaluation  PT Patient Time In/Time Out  Time In: 0900  Time Out: 3638 Nila Soto, PT    Future Appointments   Date Time Provider Jayda Carballo   8/14/2020  8:15 AM Angella Newer, PT [facility]   8/18/2020 11:00 AM Angella Newer, PT [facility]   8/19/2020  2:00 PM Angella Newer, PT [facility]   8/25/2020  9:00 AM Angella Newer, PT [facility]   8/26/2020  2:00 PM Angella Newer, PT [facility]   8/28/2020 12:00 PM Angella Newer, PT [facility]   8/31/2020  9:00 AM Angella Newer, PT [facility]   9/1/2020  9:00 AM Angella Newer, PT [facility]   9/4/2020  9:00 AM Angella Newer, PT [facility]   9/8/2020  9:00 AM Angella Newer, PT [facility]   9/9/2020  1:00 PM Angella Newer, PT [facility]   9/11/2020  9:00 AM Angella Newer, PT [facility]   10/27/2020 11:00 AM Maribell Rivera MD [facility]

## 2020-08-11 NOTE — THERAPY EVALUATION
Cherelle Ybarra  : 1980  Primary: Zenia Pearl [facility]  Secondary:  [address]  at [facility] MIGUELITO ANDREWS  1101 [facility], [address] [place] [address],[address], [facility] [address].  [phone]   [phone]       OUTPATIENT PHYSICAL THERAPY:Initial Assessment 2020   ICD-10: Treatment Diagnosis: Cervalgia (M54.2), Pain in left shoulder (M25.512)  Precautions/Allergies: none/Other medication and Penicillin g   TREATMENT PLAN:  Effective Dates: 2020 TO 2020 (90 days). Frequency/Duration: 1 to 3 times a week for 90 Day(s) MEDICAL/REFERRING DIAGNOSIS:  neck pain , left shoulder pain  DATE OF ONSET:   pain:3 years ago  Surgery:   REFERRING PHYSICIAN: Rabia Hammonds MD MD Orders: PT-evaluate and treat, modalities, core strengthening, stretching  Return MD Appointment: unsure     INITIAL ASSESSMENT:  Ms. Shilpi Mcdowell comes to therapy s/p C4-5 fusion surgery due to disc protrusion. She complains of neck and left shoulder pain along with headaches and numbess in the left palm and fingers. She presents with altered posture, scapular dyskinesia, left upper limb nerve tension, elevated first left rib, thoracic outlet syndrome, soft tissue tightness and LUE weakness. She works as a  and needs to be able to care for her home. Mrs Shilpi Mcdowell will benefit from skilled therapy to address her deficits and assist in returning functional use of her UE for ADL's. PROBLEM LIST (Impacting functional limitations):  1. Decreased Strength  2. Decreased ADL/Functional Activities  3. Increased Pain  4. Decreased Activity Tolerance  5. Increased Fatigue INTERVENTIONS PLANNED: (Treatment may consist of any combination of the following)  1. Home Exercise Program (HEP)  2. Manual Therapy  3. Neuromuscular Re-education/Strengthening  4. Range of Motion (ROM)  5.  Therapeutic Exercise/Strengthening     GOALS: (Goals have been discussed and agreed upon with patient.)  Short-Term Functional Goals: Time Frame: 3 weeks     1. Independent with HEP     2. Cervical rotation AROM WNL to allow observation of traffic when driving     3. Pt to demonstrate posture correction without cueing  Discharge Goals: Time Frame: 90 days     1. Tolerate activity > 45 minutes for job duties and home care     2. ADL's with pain < 3/10     3. LUE Strength improved to at least 4+/5 to allow safe reaching/lifting    OUTCOME MEASURE:   Tool Used: Neck Disability Index (NDI)  Score:  Initial: 14/50  Most Recent: X/50 (Date: -- )   Interpretation of Score: The Neck Disability Index is a revised form of the Oswestry Low Back Pain Index and is designed to measure the activities of daily living in person's with neck pain. Each section is scored on a 0-5 scale, 5 representing the greatest disability. The scores of each section are added together for a total score of 50. MEDICAL NECESSITY:   · Skilled intervention continues to be required due to need for manual treatment prior to exercise. REASON FOR SERVICES/OTHER COMMENTS:  · Patient continues to require skilled intervention due to need for guided introduction of exercise to improve tolerance to ADL's. Total Duration: 60 minutes with exercise instruction  PT Patient Time In/Time Out  Time In: 0900  Time Out: 1000    Rehabilitation Potential For Stated Goals: Good  Regarding Milagros Quispe's therapy, I certify that the treatment plan above will be carried out by a therapist or under their direction. Thank you for this referral,  Rd Ibarra, PT,MSPT     Referring Physician Signature: Ross Nielsen MD No Signature is Required for this note. PAIN/SUBJECTIVE:   Initial:   2-9/10 Post Session:  No increase   HISTORY:   History of Injury/Illness (Reason for Referral):  Pt reports having a gradual increase in neck and B shoulder pain over the past 3 years thinking it was from her job as a . When chiropractic adjustments didn't help she opted for surgery.   Past Medical History/Comorbidities:   Ms. Jonathan Ramirez  has a past medical history of Anemia, Asthma, Cervical myelopathy ([facility] [address] (4/8/2020), Family history of cardiomyopathy, Impaired fasting blood sugar (7/11/2017), and Vitamin D deficiency (6/10/2016). Ms. Jonathan Ramirez  has no past surgical history on file. Social History/Living Environment:     pt lives with  and children in a two story home. Prior Level of Function/Work/Activity:  Works as a  working for at least an hour with each client. Cares for the home, laundry and shopping. Personal Factors:          Coping Style:  Fear of pain when moving   Ambulatory/Rehab Services H2 Model Falls Risk Assessment   Risk Factors:       No Risk Factors Identified Ability to Rise from Chair:       (0)  Ability to rise in a single movement   Falls Prevention Plan:       Exercise/Equipment Adaptation (specify):  see documentation   Total: (5 or greater = High Risk): 0   ©2010 [facility] Patent #[id number]. Federal Law prohibits the replication, distribution or use without written permission from [facility]   Current Medications:       Current Outpatient Medications:     apixaban (ELIQUIS) 5 mg tablet, Take 1 Tab by mouth two (2) times a day., Disp: 60 Tab, Rfl: 3    acetaminophen (Tylenol) PRN     montelukast (SINGULAIR) 10 mg tablet, Take 1 Tab by mouth daily. (Patient taking differently: Take 10 mg by mouth nightly.), Disp: 90 Tab, Rfl: 3    \"muscle relaxer PRN     Date Last Reviewed:  8/11/20   Number of Personal Factors/Comorbidities that affect the Plan of Care: 1-2: MODERATE COMPLEXITY   EXAMINATION:   Observation/Orthostatic Postural Assessment:          foward head with B scapular elevation and protraction; rigid neck; over use of upper trap with reaching   Palpation:          Tender over the left subscap, left pect minor, left elevated and tender 1st rib, suboccipital region        ROM: BUE WNL.   Cervical Flexion (% of Normal): 10  Cervical Left Rotation (% of Normal): 25  Cervical Right Rotation (% of Normal): 10                               Strength:   LUE Strength  L Shoulder Flexion: 4-  L Shoulder ABduction: 4  L Shoulder Horizontal ABduction: 4  L Shoulder Internal Rotation: 4+(subscap 3)  L Shoulder External Rotation: 4  L Elbow Flexion: 4  L Elbow Extension: 4-  L Wrist Flexion: 4  L Wrist Extension: 4-                        Special Tests:          Positive left upper limb median nerve tension        Positive left thoracic Outlet Syndrome test with diminished pulse   Body Structures Involved:  1. Nerves  2. Joints  3. Muscles Body Functions Affected:  1. Sensory/Pain  2. Neuromusculoskeletal  3. Movement Related Activities and Participation Affected:  1. General Tasks and Demands  2. Self Care  3.  Domestic Life  4. job   Number of elements (examined above) that affect the Plan of Care: 3: MODERATE COMPLEXITY   CLINICAL PRESENTATION:   Presentation: Evolving clinical presentation with changing clinical characteristics: MODERATE COMPLEXITY   CLINICAL DECISION MAKING:   Use of outcome tool(s) and clinical judgement create a POC that gives a: Questionable prediction of patient's progress: MODERATE COMPLEXITY

## 2020-08-14 ENCOUNTER — HOSPITAL ENCOUNTER (OUTPATIENT)
Dept: PHYSICAL THERAPY | Age: 40
Discharge: HOME | End: 2020-08-14
Payer: COMMERCIAL

## 2020-08-14 PROCEDURE — 97110 THERAPEUTIC EXERCISES: CPT

## 2020-08-14 NOTE — PROGRESS NOTES
Sheila Renee  : 1980  Payor: Greyson Rice / Plan: [address] / Product Type: PPO /  [address] at [facility] the [facility] MIGUELITO ANDREWS  1101 [facility], [address], [place] [address].  [phone]   [phone]       OUTPATIENT PHYSICAL THERAPY: Daily Treatment Note 2020  Visit Count: 2     ICD-10: Treatment Diagnosis: Denver Killer (M54.2), Pain in left shoulder (M25.512)  Precautions/Allergies: none/Other medication and Penicillin g   TREATMENT PLAN:  Effective Dates: 2020 TO 2020 (90 days). Frequency/Duration: 1 to 3 times a week for 90 Day(s) MEDICAL/REFERRING DIAGNOSIS:  neck pain left shoulder pain  DATE OF ONSET:   Pain: 3 years ago  Surgery: 2020  REFERRING PHYSICIAN: Ketty Herron MD MD Orders: PT- evaluate and treat, modalities, core strengthening and stretching  Return MD Appointment: unsure       Pre-treatment Symptoms/Complaints:  Pt reports doing the HEP but not sure on the technique. Neck is stiff. Use the left arm more than I realized. Pain: Initial:   2-6/10 Post Session:  No worse   Medications Last Reviewed:  20    Updated Objective Findings:   Observation:better posture but leans forward to reach behind her back and uses upper trap to reach  Palpation: tight lefft upper limb ulnar nerve     TREATMENT:   Therapeutic Exercise: (20 Minutes):  Exercises to improve mobility, strength and posture. reviewed IR belly press using tband and ER using tband. Instructed pt on ulnar nerve glides for home. Required moderate verbal and demonstration cues to promote proper body posture and exercise technique. Left subscap release with stretch and facilitation at end ROM. Left ulnar and median upper limb nerve glides. Grade 4-- left C6 - T1 unilateral mobilizations with pt in prone. Treatment/Session Summary:    · Response to Treatment:  Good return demonstration of exercise after review. Improved vertebral mobility after treatment. advised pt to use heat over the upper shoulder and neck as needed  · Communication/Consultation:  None today  · Equipment provided today:  None today  · Recommendations/Intent for next treatment session: Next visit will focus on HEP upgrade, review left upper limb nerve glides, left subscap release, vertebral mobilizations    Total Treatment Billable Duration:  20 minutes   PT Patient Time In/Time Out  Time In: 0830  Time Out: N[address] Cortez [address], PT    Future Appointments   Date Time Provider Jayda Carballo   8/18/2020 11:00 AM David Mould, PT [facility]   8/19/2020  2:00 PM David Mould, PT [facility]   8/25/2020  9:00 AM David Mould, PT [facility]   8/26/2020  2:00 PM David Mould, PT [facility]   8/28/2020 12:00 PM David Mould, PT [facility]   8/31/2020  9:00 AM David Mould, PT [facility]   9/1/2020  9:00 AM David Mould, PT [facility]   9/4/2020  9:00 AM David Mould, PT [facility]   9/8/2020  9:00 AM David Mould, PT [facility]   9/9/2020  1:00 PM David Mould, PT [facility]   9/11/2020  9:00 AM David Mould, PT [facility]   10/27/2020 11:00 AM Martha Mistry MD [facility]

## 2020-08-18 ENCOUNTER — HOSPITAL ENCOUNTER (OUTPATIENT)
Dept: PHYSICAL THERAPY | Age: 40
Discharge: HOME | End: 2020-08-18
Payer: COMMERCIAL

## 2020-08-18 PROCEDURE — 97140 MANUAL THERAPY 1/> REGIONS: CPT

## 2020-08-18 PROCEDURE — 97110 THERAPEUTIC EXERCISES: CPT

## 2020-08-18 NOTE — PROGRESS NOTES
Chauncey Dalton  : 1980  Payor: Greyson Rice / Plan: [address] / Product Type: PPO /  [address] at [facility] MIGUELITO ANDREWS  1101 [facility], [address], [address] [facility]  [phone]   [phone]       OUTPATIENT PHYSICAL THERAPY: Daily Treatment Note 2020  Visit Count: 3     ICD-10: Treatment Diagnosis: Jacqueline Oeflialeman (M54.2), Pain in left shoulder (M25.512)  Precautions/Allergies: none/Other medication and Penicillin g   TREATMENT PLAN:  Effective Dates: 2020 TO 2020 (90 days). Frequency/Duration: 1 to 3 times a week for 90 Day(s) MEDICAL/REFERRING DIAGNOSIS:  neck pain left shoulder pain  DATE OF ONSET:   Pain: 3 years ago  Surgery: 2020  REFERRING PHYSICIAN: Krysta Calix MD MD Orders: PT- evaluate and treat, modalities, core strengthening and stretching  Return MD Appointment: unsure       Pre-treatment Symptoms/Complaints:  Pt reports doing the HEP and feeling more motion. Pain: Initial:   2-510 Post Session: 3-10   Medications Last Reviewed:  20    Updated Objective Findings:   Observation: over uses pect minor with IR  Palpation: tender at C6     TREATMENT:   Manual treatment (25 minutes): manual cervical traction, grade 4-- L unilateral PA glides at C6 to T1, L subscap ischemic trigger point hold, grade 4- left 2nd rib mobilizations, grade 4- to 4 physiological mobilization left shoulder flexion, left pect minor release and stretch   Therapeutic Exercise: (20 Minutes):  Exercises to improve mobility, strength and posture. IR belly press for home program.  Required minimal verbal cues to promote proper body posture and exercise technique. Left subscap stretch followed by facilitation at end ROM. Left ulnar and median upper limb nerve glides. Treatment/Session Summary:    · Response to Treatment:  Improving left upper limb nerve mobility.  Weak subscap results in overuse of pect minor  · Communication/Consultation:  None today  · Equipment provided today:  None today  · Recommendations/Intent for next treatment session: Next visit will focus on HEP upgrade, review left upper limb nerve glides, left subscap release, vertebral mobilizations    Total Treatment Billable Duration:  45 minutes   PT Patient Time In/Time Out  Time In: 1100  Time Out: [address], PT    Future Appointments   Date Time Provider Jayda Carballo   8/19/2020  2:00 PM Tamir Sosa, [place] [facility]   8/25/2020  9:00 AM Tamir Sosa, PT [facility]   8/26/2020  2:00 PM Tamir Sosa, PT [facility]   8/28/2020 12:00 PM Tamir Sosa, PT [facility]   8/31/2020  9:00 AM Tamir Sosa, PT [facility]   9/1/2020  9:00 AM Tamir Sosa, PT [facility]   9/4/2020  9:00 AM Tamir Sosa, PT [facility]   9/8/2020  9:00 AM Tamir Sosa, PT [facility]   9/9/2020  1:00 PM Tamir Sosa, PT [facility]   9/11/2020  9:00 AM Tamir Sosa, PT [facility]   10/27/2020 11:00 AM Gisele Sol MD [facility]

## 2020-08-19 ENCOUNTER — HOSPITAL ENCOUNTER (OUTPATIENT)
Dept: PHYSICAL THERAPY | Age: 40
Discharge: HOME | End: 2020-08-19
Payer: COMMERCIAL

## 2020-08-19 PROCEDURE — 97110 THERAPEUTIC EXERCISES: CPT

## 2020-08-19 PROCEDURE — 97035 APP MDLTY 1+ULTRASOUND EA 15: CPT

## 2020-08-19 NOTE — PROGRESS NOTES
Jamal Plan  : 1980  Payor: [address] Rice / Plan: [address] / Product Type: PPO /  [address] at [facility] MIGUELITO ANDREWS  1101 [facility], [address], [place] [address].  [phone]   [phone]       OUTPATIENT PHYSICAL THERAPY: Daily Treatment Note 2020  Visit Count: 4     ICD-10: Treatment Diagnosis: Jared Burch (M54.2), Pain in left shoulder (M25.512)  Precautions/Allergies: none/Other medication and Penicillin g   TREATMENT PLAN:  Effective Dates: 2020 TO 2020 (90 days). Frequency/Duration: 1 to 3 times a week for 90 Day(s) MEDICAL/REFERRING DIAGNOSIS:  neck pain left shoulder pain  DATE OF ONSET:   Pain: 3 years ago  Surgery: 2020  REFERRING PHYSICIAN: Derek Diaz MD MD Orders: PT- evaluate and treat, modalities, core strengthening and stretching  Return MD Appointment: unsure       Pre-treatment Symptoms/Complaints:  Pt reports the neck hurting last night. Used heat and muscle relaxer. did the HEP  Pain: Initial:   -8/10 Post Session: 3-4/10   Medications Last Reviewed:  20    Updated Objective Findings:   Observation:   Palpation: tender at C6     TREATMENT:   Modalities(10 minutes) continuous ultrasound at 1.8w/cm2 to B upper trap, C6 and B Rhomboid regions for mechanical effects on soft tissue  Manual treatment ( minutes): none  Therapeutic Exercise: (28 Minutes):  Exercises to improve mobility, strength and posture. Required minimal verbal cues to promote proper body posture and exercise technique. Left subscap  Release prior to stretch followed by facilitation at end ROM.     Date:  20 Date:   Date:     Activity/Exercise Parameters Parameters Parameters   LTR 1.5# B ankles x30 with core and B scapular stabilization     B IR Seated controlled ecc 2x5 ea    Supine table press 2x5    Seated IR belly press against yellow tband 2x5     B ER Seated against yellow tband 2x10     Chin tuck Supine into pillow x5  Seated x5 Treatment/Session Summary:    · Response to Treatment:  Weak subscap and cuff B'ly  · Communication/Consultation:  None today  · Equipment provided today:  None today  · Recommendations/Intent for next treatment session: Next visit will focus on HEP upgrade, nerve glides,strengthening, subscap release, vertebral mobilizations    Total Treatment Billable Duration:  38 minutes   PT Patient Time In/Time Out  Time In: 1315  Time Out: [address] [facility], PT    Future Appointments   Date Time Provider Jayda Carballo   8/25/2020  9:00 AM Walkerkriss Ruby, [place] [facility]   8/26/2020  2:00 PM Walkerkriss Ruby, PT [facility]   8/28/2020 12:00 PM Walker Ruby, PT [facility]   8/31/2020  9:00 AM Walkerkriss Ruby, PT [facility]   9/1/2020  9:00 AM Walkernancy Ruby, PT [facility]   9/4/2020  9:00 AM Walkerkriss Ruby, PT [facility]   9/8/2020  9:00 AM Walkermiah Ruby, PT [facility]   9/9/2020  1:00 PM Walker Ruby, PT [facility]   9/11/2020  9:00 AM Walkermiah Ruby, PT [facility]   10/27/2020 11:00 AM Farrah Cabral MD [facility]

## 2020-08-25 ENCOUNTER — HOSPITAL ENCOUNTER (OUTPATIENT)
Dept: PHYSICAL THERAPY | Age: 40
Discharge: HOME | End: 2020-08-25
Payer: COMMERCIAL

## 2020-08-25 PROCEDURE — 97110 THERAPEUTIC EXERCISES: CPT

## 2020-08-25 PROCEDURE — 97035 APP MDLTY 1+ULTRASOUND EA 15: CPT

## 2020-08-25 NOTE — PROGRESS NOTES
Dash Marin  : 1980  Payor: [address] / Plan: [address] / Product Type: PPO /  [address]  at [address] [place] [address] Rd  1101 [facility], [address], [place] [address].  [phone]   [phone]       OUTPATIENT PHYSICAL THERAPY: Daily Treatment Note 2020  Visit Count: 5     ICD-10: Treatment Diagnosis: Vada Mortimer (M54.2), Pain in left shoulder (M25.512)  Precautions/Allergies: none/Other medication and Penicillin g   TREATMENT PLAN:  Effective Dates: 2020 TO 2020 (90 days). Frequency/Duration: 1 to 3 times a week for 90 Day(s) MEDICAL/REFERRING DIAGNOSIS:  neck pain cervical fusion with left shoulder pain  DATE OF ONSET:   Pain: 3 years ago  Surgery: 2020  REFERRING PHYSICIAN: Carol Hall MD MD Orders: PT- evaluate and treat, modalities, core strengthening and stretching  Return MD Appointment: unsure       Pre-treatment Symptoms/Complaints:  Pt reports Saturday being a horrible day. ?sleeping? Did the HEP  Pain: Initial:   stiff and sore 6/10 Post Session: 3/10   Medications Last Reviewed:  20 forgot to tell you about  Zanaflex, Tylenol  Updated Objective Findings:   Observation: pt demonstrating ability to lift off with both hands  Palpation: tender at C6- T3 region, B sub scap, left levator scapular muscle     TREATMENT:   Modalities(10 minutes) continuous ultrasound at 2.0 w/cm2 to B upper trap, C6 and B Rhomboid regions for mechanical effects on soft tissue prior to exercises  Manual treatment ( 5 minutes):manual cervical traction for soft tissue decompression prior to exercises. Therapeutic Exercise: (29 Minutes): Reviewed and issued upgraded HEP with: Tball LTR with B shoulders 90/90, midtrap and low trap- all with facilitated chin tuck; supine tball hand off. Exercises to improve mobility, strength and posture. Required minimal to moderate verbal cues to promote proper body posture and exercise technique.     Date:  20 Date:  8/25/20 Date:     Activity/Exercise Parameters Parameters Parameters   LTR 1.5# B ankles x30 with core and B scapular stabilization x25 B UE position:  90/90  Lower trap  midtrap    B IR Seated controlled ecc 2x5 ea    Supine table press 2x5    Seated IR belly press against yellow tband 2x5 Seated lift offs     B ER Seated against yellow tband 2x10     Chin tuck Supine into pillow x5  Seated x5 Supine after positioning of the head               Treatment/Session Summary:    · Response to Treatment:  Good return demonstration of exercise after review.    · Communication/Consultation:  None today  · Equipment provided today:  None today  · Recommendations/Intent for next treatment session: Next visit will focus on HEP upgrade as appropriate, nerve glides,strengthening, subscap release, vertebral mobilizations    Total Treatment Billable Duration:  44 minutes   PT Patient Time In/Time Out  Time In: 0905  Time Out: [address], PT    Future Appointments   Date Time Provider Jayda Carballo   8/26/2020  2:00 PM Newt Plume, [place] [facility]   8/28/2020 12:00 PM Newt Plume, PT [facility]   9/1/2020  9:00 AM Newt Plume, PT [facility]   9/4/2020  9:00 AM Newt Plume, PT [facility]   9/8/2020  9:00 AM Newt Plume, PT [facility]   9/9/2020  1:00 PM Newt Plume, PT [facility]   9/11/2020  9:00 AM Newt Plume, PT [facility]   9/15/2020  1:00 PM Newt Plume, PT [facility]   9/18/2020  9:00 AM Newt Plume, PT [facility]   9/22/2020 11:00 AM Newt Plume, PT [facility]   9/25/2020 12:00 PM Newt Plume, PT [facility]   10/27/2020 11:00 AM Pedro Hsu MD [facility] CAF CAF

## 2020-08-26 ENCOUNTER — HOSPITAL ENCOUNTER (OUTPATIENT)
Dept: PHYSICAL THERAPY | Age: 40
Discharge: HOME | End: 2020-08-26
Payer: COMMERCIAL

## 2020-08-28 ENCOUNTER — HOSPITAL ENCOUNTER (OUTPATIENT)
Dept: PHYSICAL THERAPY | Age: 40
Discharge: HOME | End: 2020-08-28
Payer: COMMERCIAL

## 2020-08-28 PROCEDURE — 97110 THERAPEUTIC EXERCISES: CPT

## 2020-08-28 PROCEDURE — 97035 APP MDLTY 1+ULTRASOUND EA 15: CPT

## 2020-08-28 PROCEDURE — 97140 MANUAL THERAPY 1/> REGIONS: CPT

## 2020-08-28 NOTE — PROGRESS NOTES
Sheila Schillingdennise  : 1980  Payor: Greyson Rice / Plan: [address] / Product Type: PPO /  [address]  at [facility] MIGUELITO ANDREWS  1101 [facility], [address], [place] [address].  [phone]   [phone]       OUTPATIENT PHYSICAL THERAPY: Daily Treatment Note 2020  Visit Count: 6     ICD-10: Treatment Diagnosis: Denver Killer (M54.2), Pain in left shoulder (M25.512)  Precautions/Allergies: none/Other medication and Penicillin g   TREATMENT PLAN:  Effective Dates: 2020 TO 2020 (90 days). Frequency/Duration: 1 to 3 times a week for 90 Day(s) MEDICAL/REFERRING DIAGNOSIS:  neck pain cervical fusion with left shoulder pain  DATE OF ONSET:   Pain: 3 years ago  Surgery: 2020  REFERRING PHYSICIAN: Ketty Herron MD MD Orders: PT- evaluate and treat, modalities, core strengthening and stretching  Return MD Appointment: unsure       Pre-treatment Symptoms/Complaints:  Pt reports doing the HEP. ? sleeping is causing arm and neck pain. Will change pillow  Pain: Initial:    No number given Post Session: It's way better   Medications Last Reviewed:  20   Zanaflex, Tylenol  Updated Objective Findings:   Observation: forward head when on her cell phone  Palpation: tender at  T3 region, R sub scap, R scalene     TREATMENT:   Modalities(10 minutes) continuous ultrasound at 2.0 w/cm2 to B upper trap, C6 and B Rhomboid regions for mechanical effects on soft tissue prior to exercises  Manual treatment ( 15 minutes):manual cervical traction for soft tissue decompression prior to exercises, R upper limb nerve glides with stabilization of the first and second rib, right scalene and subscap release  Therapeutic Exercise: (15 Minutes):  Exercises to improve mobility, strength and posture. Required minimal to moderate verbal cues to promote proper body posture and exercise technique.     Date:  20 Date:  20 Date:  20   Activity/Exercise Parameters Parameters Parameters   LTR 1.5# B ankles x30 with core and B scapular stabilization x25 B UE position:  90/90  Lower trap  midtrap    B IR Seated controlled ecc 2x5 ea    Supine table press 2x5    Seated IR belly press against yellow tband 2x5 Seated lift offs  Supine resisted B belly press with controlled ecc   B ER Seated against yellow tband 2x10     Chin tuck Supine into pillow x5  Seated x5 Supine after positioning of the head 2x8              Treatment/Session Summary:    · Response to Treatment:  Good response to treatment with improved strength and pain. Needs to work on posture.    · Communication/Consultation:  None today  · Equipment provided today:  None today  · Recommendations/Intent for next treatment session: Next visit will focus on HEP upgrade as appropriate, nerve glides,strengthening, subscap release, vertebral mobilizations    Total Treatment Billable Duration:  40 minutes   PT Patient Time In/Time Out  Time In: 1200  Time Out: [address] Sidd[address] Lay, PT    Future Appointments   Date Time Provider Jayda Carballo   9/1/2020  9:00 AM Amanuel Jackson, PT [facility]   9/4/2020  9:00 AM Amanuel Jackson, PT [facility]   9/8/2020  9:00 AM Amanuel Jackson, PT [facility]   9/9/2020  1:00 PM Amanuel Jackson, PT [facility]   9/11/2020  9:00 AM Amanuel Jackson, PT [facility]   9/15/2020  1:00 PM Amanuel Jackson, PT [facility]   9/18/2020  9:00 AM Amanuel Jackson, PT [facility]   9/22/2020 11:00 AM Amanuel Jackson, PT [facility]   9/25/2020 12:00 PM Amanuel Jackson, PT [facility]   10/27/2020 11:00 AM Mark Charles MD [facility]

## 2020-08-31 ENCOUNTER — APPOINTMENT (OUTPATIENT)
Dept: PHYSICAL THERAPY | Age: 40
End: 2020-08-31
Payer: COMMERCIAL

## 2020-09-01 ENCOUNTER — HOSPITAL ENCOUNTER (OUTPATIENT)
Dept: PHYSICAL THERAPY | Age: 40
Discharge: HOME | End: 2020-09-01
Payer: COMMERCIAL

## 2020-09-01 PROCEDURE — 97140 MANUAL THERAPY 1/> REGIONS: CPT

## 2020-09-01 PROCEDURE — 97110 THERAPEUTIC EXERCISES: CPT

## 2020-09-01 NOTE — PROGRESS NOTES
Cherelle Ybarra  : 1980  Payor: Greyson Rice / Plan: [address] / Product Type: PPO /  [address]  at [facility] MIGUELITO ANDREWS  [address] [facility], [place], [address], [place] [address].  [phone]   [phone]       OUTPATIENT PHYSICAL THERAPY: Daily Treatment Note 2020  Visit Count: 7     ICD-10: Treatment Diagnosis: Shun Persons (M54.2), Pain in left shoulder (M25.512)  Precautions/Allergies: none/Other medication and Penicillin g   TREATMENT PLAN:  Effective Dates: 2020 TO 2020 (90 days). Frequency/Duration: 1 to 3 times a week for 90 Day(s) MEDICAL/REFERRING DIAGNOSIS:  neck pain cervical fusion with left shoulder pain  DATE OF ONSET:   Pain: 3 years ago  Surgery: 2020  REFERRING PHYSICIAN: Rabia Hammonds MD MD Orders: PT- evaluate and treat, modalities, core strengthening and stretching  Return MD Appointment: unsure       Pre-treatment Symptoms/Complaints:  Pt reports doing the HEP and has change the pillow  Pain: Initial:    -3/10 Post Session: feeling tired   Medications Last Reviewed:  20     Updated Objective Findings:   Observation: forward head, use of upper trap with reaching when fatigued  Palpation: B sub scap, B scalene     TREATMENT:   Modalities( minutes) none  Manual treatment ( 10 minutes):manual cervical traction for soft tissue decompression prior to exercises, B scalene stretch with stabilization rib, B subscap release  Therapeutic Exercise: (30 Minutes): reviewed and issued upgraded HEP with prone B lower trap, B ER, B IR. Emphasis on scapular retraction/depression, core activation and posterior pelvic tilt. Exercises to improve mobility, strength and posture. Required minimal to occasional moderate verbal cues to promote proper body posture and exercise technique.     Date:  20 Date:  20 Date:  20    Activity/Exercise Parameters Parameters Parameters     LTR 1.5# B ankles x30 with core and B scapular stabilization x25 B UE position:  90/90  Lower trap  midtrap  1.5# B ankles  x25    B IR Seated controlled ecc 2x5 ea    Supine table press 2x5    Seated IR belly press against yellow tband 2x5 Seated lift offs  Supine resisted B belly press with controlled ecc Standing with belly press  green tband 2x10    B ER Seated against yellow tband 2x10   Standing green B 2x10    Chin tuck Supine into pillow x5  Seated x5 Supine after positioning of the head 2x8     diagonal    B green 2x5         Treatment/Session Summary:    · Response to Treatment:  Good response to treatment with improving strength and pain. Focused more on posture correction to decrease compensation of upper trap, pect and levator scap muscles  · Communication/Consultation:  None today  · Equipment provided today:  None today  · Recommendations/Intent for next treatment session: Next visit will focus on HEP upgrade as appropriate,strengthening, subscap release, vertebral mobilizations.  Assess ULNT    Total Treatment Billable Duration:  40 minutes   PT Patient Time In/Time Out  Time In: 0900  Time Out: 201 [address] [place] [address],     Future Appointments   Date Time Provider Jayda Carballo   9/4/2020  9:00 AM Luis E Beavers, PT [facility]   9/8/2020  9:00 AM Luis E Beavers, PT [facility]   9/9/2020  1:00 PM Luis E Beavers, PT [facility]   9/11/2020  9:00 AM Luis E Beavers, PT [facility]   9/15/2020  1:00 PM Luis E Beavers, PT [facility]   9/18/2020  9:00 AM Luis E Beavers, PT [facility]   9/22/2020 11:00 AM Luis E Beavers, PT [facility]   9/25/2020 12:00 PM Luis E Beavers, PT [facility]   9/28/2020  2:00 PM Luis E Beavers, PT [facility]   9/29/2020  2:00 PM Luis E Beavers, PT [facility]   10/2/2020 10:00 AM Luis E Beavers, PT [facility]   10/27/2020 11:00 AM Radha Mc MD [facility]

## 2020-09-04 ENCOUNTER — HOSPITAL ENCOUNTER (OUTPATIENT)
Dept: PHYSICAL THERAPY | Age: 40
Discharge: HOME | End: 2020-09-04
Payer: COMMERCIAL

## 2020-09-04 PROCEDURE — 97110 THERAPEUTIC EXERCISES: CPT

## 2020-09-04 PROCEDURE — 97140 MANUAL THERAPY 1/> REGIONS: CPT

## 2020-09-04 NOTE — PROGRESS NOTES
Jamal Plan  : 1980  Payor: 550Rupali Rice / Plan: [id number] [facility] / Product Type: PPO /  [address] at [facility] MIGUELITO ANDREWS  [address] [facility], [address], [address] [facility]  [phone]   [phone]       OUTPATIENT PHYSICAL THERAPY: Daily Treatment Note 2020  Visit Count: 8     ICD-10: Treatment Diagnosis: Jared Burch (M54.2), Pain in left shoulder (M25.512)  Precautions/Allergies: none/Other medication and Penicillin g   TREATMENT PLAN:  Effective Dates: 2020 TO 2020 (90 days). Frequency/Duration: 1 to 3 times a week for 90 Day(s) MEDICAL/REFERRING DIAGNOSIS:  neck pain cervical fusion with left shoulder pain  DATE OF ONSET:   Pain: 3 years ago  Surgery: 2020  REFERRING PHYSICIAN: Derek Diaz MD MD Orders: PT- evaluate and treat, modalities, core strengthening and stretching  Return MD Appointment: unsure       Pre-treatment Symptoms/Complaints:  Pt reports not doing the HEP due to time constraints. Will do better   Pain: Initial:      3-4/10 Post Session: tired but pain no worse   Medications Last Reviewed:  20     Updated Objective Findings:   Observation: forward head, use of upper trap, B IR lift off behind the back WNL   Palpation: B T2-3, inhibited firing of L C spine paraspinal during chin tuck  Strength: after treatment  LUE Strength  L Shoulder Internal Rotation: 4(subscap)    RUE Strength  R Shoulder Internal Rotation: 4+(subscap)              TREATMENT:   Modalities( minutes) none  Manual treatment ( 10 minutes):manual cervical traction for soft tissue decompression, prone grade 4-- to 4 T1 to T4 central PA glides and grade 4-- to 4- L T3 unilateral PA prior to exercises  Therapeutic Exercise: (30 Minutes): Emphasis on scapular retraction/depression, core activation and posterior pelvic tilt. Instructed pt on seated resisted chin tuck using yellow tband 2x 60 second hold. Exercises to improve mobility, strength and posture.   Required minimal verbal cues to promote proper body posture and exercise technique. Date:  8/19/20 Date:  8/25/20 Date:  8/28/20 9/1/20 9/4/20   Activity/Exercise Parameters Parameters Parameters     LTR 1.5# B ankles x30 with core and B scapular stabilization x25 B UE position:  90/90  Lower trap  midtrap  1.5# B ankles  x25 2# x25   B IR Seated controlled ecc 2x5 ea    Supine table press 2x5    Seated IR belly press against yellow tband 2x5 Seated lift offs  Supine resisted B belly press with controlled ecc Standing with belly press  green tband 2x10    B ER Seated against yellow tband 2x10   Standing green B 2x10 Blue neutral 2x10    90/90 green 2x10    Scaption green 2x10   Chin tuck Supine into pillow x5  Seated x5 Supine after positioning of the head 2x8  Supine facilitation after head placement. Seated resisted yellow tband 2x60 sec   diagonal    B green 2x5         Treatment/Session Summary:    · Response to Treatment:  Good response to treatment with improving strength in subscap. Weak chin tuck on the left as compared to the left. Focused on decreasing compensation of upper trap, pect and levator scap muscles. Unable to assess limb tension due to having to review exercises  · Communication/Consultation:  None today  · Equipment provided today:  None today  · Recommendations/Intent for next treatment session: Next visit will focus on HEP upgrade,strengthening, assess vertebral mobilizations.  Assess ULNT next visit    Total Treatment Billable Duration:  40 minutes   PT Patient Time In/Time Out  Time In: 0900  Time Out: 201 [address] [place] [address], PT    Future Appointments   Date Time Provider Jayda Carballo   9/8/2020  9:00 AM Ileana Epperson PT [facility]   9/9/2020  1:00 PM Ileana Epperson PT [facility]   9/11/2020  9:00 AM JULIAN Burt   9/15/2020  1:00 PM Ileana Epperson PT [facility]   9/18/2020  9:00 AM Ileana Epperson PT [facility]   9/22/2020 11:00 AM Milus Crenshaw, PT [facility]   9/25/2020 12:00 PM Milus Crenshaw, PT [facility]   9/28/2020  2:00 PM Milus Crenshaw, PT [facility]   9/29/2020  2:00 PM Milus Crenshaw, PT [facility]   10/2/2020 10:00 AM Milus Crenshaw, PT [facility]   10/27/2020 11:00 AM Susy Hernandez MD [facility]

## 2020-09-08 ENCOUNTER — HOSPITAL ENCOUNTER (OUTPATIENT)
Dept: PHYSICAL THERAPY | Age: 40
Discharge: HOME | End: 2020-09-08
Payer: COMMERCIAL

## 2020-09-08 PROCEDURE — 97110 THERAPEUTIC EXERCISES: CPT

## 2020-09-08 PROCEDURE — 97140 MANUAL THERAPY 1/> REGIONS: CPT

## 2020-09-08 NOTE — PROGRESS NOTES
Ruchi Mani  : 1980  Payor: [id number] / Plan: [address] / Product Type: PPO /  [address] at [facility] MIGUELITO ANDREWS  [address] [facility], [address], [address] [place] [address]  [phone]   [phone]       OUTPATIENT PHYSICAL THERAPY: Daily Treatment Note 2020  Visit Count: 9     ICD-10: Treatment Diagnosis: Tres Mikhail (M54.2), Pain in left shoulder (M25.512)  Precautions/Allergies: none/Other medication and Penicillin g   TREATMENT PLAN:  Effective Dates: 2020 TO 2020 (90 days). Frequency/Duration: 1 to 3 times a week for 90 Day(s) MEDICAL/REFERRING DIAGNOSIS:  neck pain cervical fusion with left shoulder pain  DATE OF ONSET:   Pain: 3 years ago  Surgery: 2020  REFERRING PHYSICIAN: Pedro Luis Mcconnell MD MD Orders: PT- evaluate and treat, modalities, core strengthening and stretching  Return MD Appointment: unsure       Pre-treatment Symptoms/Complaints:  Pt reports doing the HEP. Neck pain from sleeping. Was great up until yesterday   Pain: Initial:    7 /10 Post Session: 5-6   Medications Last Reviewed:  20     Updated Objective Findings:   Observation:  B IR lift off behind the back WNL last visit   Palpation: tender over the left subscasp, tight B upper limb tension for median and right lunar nerves. Strength: n/t                      TREATMENT:   Modalities( minutes) none  Manual treatment ( 20 minutes):manual cervical traction for soft tissue decompression, R 1st rib glides and scalene release, left upper limb nerve glides for median nerve with stabilization of the first rib prior to exercises  Therapeutic Exercise: (25 Minutes): instructed pt on B self subscap release. Reviewed home program with strengthening, stretching, nerve glides for median while seated and ulnar while supine. Exercises to improve mobility, strength and posture. Required minimal verbal cues to promote proper body posture and exercise technique.     Date:  20 Date:  8/25/20 Date:  8/28/20 9/1/20 9/4/20 9/8/20   Activity/Exercise Parameters Parameters Parameters      LTR 1.5# B ankles x30 with core and B scapular stabilization x25 B UE position:  90/90  Lower trap  midtrap  1.5# B ankles  x25 2# x25    B IR Seated controlled ecc 2x5 ea    Supine table press 2x5    Seated IR belly press against yellow tband 2x5 Seated lift offs  Supine resisted B belly press with controlled ecc Standing with belly press  green tband 2x10 Manual resisted Belly press in supine    B ER Seated against yellow tband 2x10   Standing green B 2x10 Blue neutral 2x10    90/90 green 2x10    Scaption green 2x10    Chin tuck Supine into pillow x5  Seated x5 Supine after positioning of the head 2x8  Supine facilitation after head placement. Seated resisted yellow tband 2x60 sec    diagonal    B green 2x5          Treatment/Session Summary:    · Response to Treatment:  Good return demonstration of home program. Tight right pect and levator scap muscles. · Communication/Consultation:  None today  · Equipment provided today:  None today  · Recommendations/Intent for next treatment session: Next visit will focus on HEP upgrade,strengthening, assess vertebral mobilizations.  Assess paraspinal inhibition    Total Treatment Billable Duration:  45 minutes   PT Patient Time In/Time Out  Time In: 0900  Time Out: Nila Cortez [address], PT    Future Appointments   Date Time Provider Jayda Carballo   9/9/2020  1:00 PM Angella Johns [place] [facility]   9/11/2020  9:00 AM Angella Johns, PT [facility]   9/15/2020  1:00 PM Angella Johns, PT [facility]   9/18/2020  9:00 AM Angella Johns, PT [facility]   9/22/2020 11:00 AM Angella Johns, PT [facility]   9/25/2020 12:00 PM Angella Johns, PT [facility]   9/28/2020  2:00 PM Angella Johns, PT [facility]   9/29/2020  2:00 PM [facility]   10/2/2020 10:00 AM Ada Tomlin PT [facility]   10/27/2020 11:00 AM Antonio Kuo MD [facility]

## 2020-09-09 ENCOUNTER — HOSPITAL ENCOUNTER (OUTPATIENT)
Dept: PHYSICAL THERAPY | Age: 40
Discharge: HOME | End: 2020-09-09
Payer: COMMERCIAL

## 2020-09-09 PROCEDURE — 97140 MANUAL THERAPY 1/> REGIONS: CPT

## 2020-09-09 PROCEDURE — 97110 THERAPEUTIC EXERCISES: CPT

## 2020-09-09 NOTE — PROGRESS NOTES
Chad Resendez  : 1980  Payor: Greyson Rice / Plan: [address] / Product Type: PPO /  [address] at [facility] MIGUELITO ANDREWS  [address] [facility], [address], [place] [address].  [phone]   [phone]       OUTPATIENT PHYSICAL THERAPY: Daily Treatment Note 2020  Visit Count: 10     ICD-10: Treatment Diagnosis: Cervalgia (M54.2), Pain in left shoulder (M25.512)  Precautions/Allergies: none/Other medication and Penicillin g   TREATMENT PLAN:  Effective Dates: 2020 TO 2020 (90 days). Frequency/Duration: 1 to 3 times a week for 90 Day(s) MEDICAL/REFERRING DIAGNOSIS:  neck pain cervical fusion with left shoulder pain  DATE OF ONSET:   Pain: 3 years ago  Surgery: 2020  REFERRING PHYSICIAN: Lissa Pickering MD MD Orders: PT- evaluate and treat, modalities, core strengthening and stretching  Return MD Appointment: unsure       Pre-treatment Symptoms/Complaints:  Pt reports doing the HEP and walking farther. Neck pain is less   Pain: Initial:    -3 /10 Post Session: 2   Medications Last Reviewed:  20     Updated Objective Findings:   Observation: observed to have significant forward head posture when filling out paper work    Palpation: B subscap \"but not nearly as bad\". Right nerve tension more than left  Strength:                       TREATMENT:   Modalities( minutes) none  Manual treatment ( 22 minutes):manual cervical traction for soft tissue decompression, R 1st rib glides and scalene release, left upper limb nerve glides for median nerve with stabilization of the first rib prior to exercises  Therapeutic Exercise: (20 Minutes): instructed pt on B self subscap release. Reviewed home program with strengthening, stretching, nerve glides for median while seated and ulnar while supine. Exercises to improve mobility, strength and posture. Required minimal verbal cues to promote proper body posture and exercise technique.     Date:  20 Date:  20 Date:  8/28/20 9/1/20 9/4/20 9/8/20   Activity/Exercise Parameters Parameters Parameters      LTR 1.5# B ankles x30 with core and B scapular stabilization x25 B UE position:  90/90  Lower trap  midtrap  1.5# B ankles  x25 2# x25    B IR Seated controlled ecc 2x5 ea    Supine table press 2x5    Seated IR belly press against yellow tband 2x5 Seated lift offs  Supine resisted B belly press with controlled ecc Standing with belly press  green tband 2x10 Manual resisted Belly press in supine    B ER Seated against yellow tband 2x10   Standing green B 2x10 Blue neutral 2x10    90/90 green 2x10    Scaption green 2x10    Chin tuck Supine into pillow x5  Seated x5 Supine after positioning of the head 2x8  Supine facilitation after head placement. Seated resisted yellow tband 2x60 sec    diagonal    B green 2x5          Treatment/Session Summary:    · Response to Treatment:  Good return demonstration of home program. Tight right pect and levator scap muscles. · Communication/Consultation:  None today  · Equipment provided today:  None today  · Recommendations/Intent for next treatment session: Next visit will focus on HEP,strengthening. Discuss possible decrease in attendance.     Total Treatment Billable Duration:  42 minutes   PT Patient Time In/Time Out  Time In: Kina  Time Out: Dennis Haynes, PT    Future Appointments   Date Time Provider Jayda Carballo   9/11/2020  9:00 AM Walker Ruby, PT [facility]   9/15/2020  1:00 PM Walker Ruby, PT [facility]   9/18/2020  9:00 AM Walkerkriss Ruby, PT [facility]   9/22/2020 11:00 AM Walkermiah Ruby, PT [facility]   9/25/2020 12:00 PM Walkerkriss Ruby, PT [facility]   9/28/2020  2:00 PM Walker Flori, PT [facility]   9/29/2020  2:00 PM Walkermiah Ruby, PT [facility]   10/2/2020 10:00 AM Walkerkriss Ruby, PT [facility]   10/27/2020 11:00 AM Deric Rosales Cayetano Cota MD [facility]

## 2020-09-09 NOTE — PROGRESS NOTES
Sheila Schillingdennise  : 1980  Primary: [facility]  Secondary:  [address]  at [facility] MIGUELITO ANDREWS  [address] [facility], [address] [place] [address],[address], [facility] [address]  [phone]   [phone]       OUTPATIENT PHYSICAL THERAPY:Progress Report 2020   ICD-10: Treatment Diagnosis: Cervalgia (M54.2), Pain in left shoulder (M25.512)  Precautions/Allergies: none/Other medication and Penicillin g   TREATMENT PLAN:  Effective Dates: 2020 TO 2020 (90 days). Frequency/Duration: 1 to 3 times a week for 90 Day(s) MEDICAL/REFERRING DIAGNOSIS:  neck pain , left shoulder pain  DATE OF ONSET:   pain:3 years ago  Surgery:   REFERRING PHYSICIAN: Ketty Herron MD MD Orders: PT-evaluate and treat, modalities, core strengthening, stretching  Return MD Appointment: unsure      Progress  ASSESSMENT:  Ms. Karen Oliveros came to therapy s/p C4-5 fusion surgery due to disc protrusion. She demonstrates AROM of the neck WNL. Left shoulder strength and AROM are WNL. R shoulder has occasional pain after activity or over use of the right arm. Nerve tension is only on the right and responds well to treatment. She has occasional headaches on the right side after activity. There is no longer numbess in the left palm or fingers. She presents with great posture. Still with some Right scapular dyskinesia when having pain or radicular symptoms. negative thoracic outlet syndrome test. Goals are being met. Assessing decreasing attendance prior to discharge. Mrs Karen Oliveros will benefit from continued skilled therapy to address her returning functional use of her UE for ADL's and job. GOALS: (Goals have been discussed and agreed upon with patient.)  Short-Term Functional Goals: Time Frame: 3 weeks     1. Independent with HEP- met     2. Cervical rotation AROM WNL to allow observation of traffic when driving-met     3.  Pt to demonstrate posture correction without cueing-met  Discharge Goals: Time Frame: 90 days 1. Tolerate activity > 45 minutes for job duties and home care- almost met     2. ADL's with pain < 3/10 met in the left arm and neck     3. LUE Strength improved to at least 4+/5 to allow safe reaching/lifting- met in the left    OUTCOME MEASURE:   Tool Used: Neck Disability Index (NDI)  Score:  Initial: 14/50  Most Recent: 10/50 (Date: 9/9/20 )   Interpretation of Score: The Neck Disability Index is a revised form of the Oswestry Low Back Pain Index and is designed to measure the activities of daily living in person's with neck pain. Each section is scored on a 0-5 scale, 5 representing the greatest disability. The scores of each section are added together for a total score of 50. PAIN/SUBJECTIVE:   Initial:    4-5 /10 Post Session:  2   HISTORY:   History of Injury/Illness (Reason for Referral):  Pt reports having a gradual increase in neck and B shoulder pain over the past 3 years thinking it was from her job as a . When chiropractic adjustments didn't help she opted for surgery. Past Medical History/Comorbidities:   Ms. Yoselyn Reinoso  has a past medical history of Anemia, Asthma, Cervical myelopathy ([facility] [address] (4/8/2020), Family history of cardiomyopathy, Impaired fasting blood sugar (7/11/2017), and Vitamin D deficiency (6/10/2016). Ms. Yoselyn Reinoso  has no past surgical history on file. Social History/Living Environment:     pt lives with  and children in a two story home. Prior Level of Function/Work/Activity:  Works as a  working for at least an hour with each client. Cares for the home, laundry and shopping. Personal Factors:          Coping Style:  Fear of pain when moving             EXAMINATION:   Observation/Orthostatic Postural Assessment:         Good posture correction. only over use of right upper trap with reaching when fatigued. Palpation:                  ROM: BUE and neck WNL. Strength: LUE 5/5.  R shoulder 4+/5 except for subscap and shoulder ER 4 when fatigued                           Special Tests:          Negative L upper limb median nerve tension for median and ulnar nerves. Positive R for both nerves when having neck pain.  But responds well to treatment        negative thoracic Outlet Syndrome test

## 2020-09-11 ENCOUNTER — HOSPITAL ENCOUNTER (OUTPATIENT)
Dept: PHYSICAL THERAPY | Age: 40
Discharge: HOME | End: 2020-09-11
Payer: COMMERCIAL

## 2020-09-11 PROCEDURE — 97110 THERAPEUTIC EXERCISES: CPT

## 2020-09-11 NOTE — PROGRESS NOTES
Griffin Sierra  : 1980  Payor: Greyson Rice / Plan: [address] / Product Type: PPO /  [address]  at [facility] MIGUELITO ANDREWS  [address] [facility], [address], [place] [address].  [phone]   [phone]       OUTPATIENT PHYSICAL THERAPY: Daily Treatment Note 2020  Visit Count: 11     ICD-10: Treatment Diagnosis: Angelique Oviedo (M54.2), Pain in left shoulder (M25.512)  Precautions/Allergies: none/Other medication and Penicillin g   TREATMENT PLAN:  Effective Dates: 2020 TO 2020 (90 days). Frequency/Duration: 1 to 3 times a week for 90 Day(s) MEDICAL/REFERRING DIAGNOSIS:  neck pain cervical fusion with left shoulder pain  DATE OF ONSET:   Pain: 3 years ago  Surgery: 2020  REFERRING PHYSICIAN: José Miguel Arthur MD MD Orders: PT- evaluate and treat, modalities, core strengthening and stretching  Return MD Appointment: unsure       Pre-treatment Symptoms/Complaints:  Pt reports sleeping wrong on the right last night and having pain. Neck pain is less   Pain: Initial:    5-6/ 10 right,    0/10 left Post Session: tired! In the right . Left is no problem   Medications Last Reviewed:  20     Updated Objective Findings:   Observation: observed to have significant forward head posture when filling out paper work    Palpation: B subscap \"but not nearly as bad\". Right nerve tension more than left  Strength: n/t                      TREATMENT:   Modalities( minutes) none  Manual treatment ( minutes):none  Therapeutic Exercise: (40 Minutes): instructed pt on R self subscap release. Reviewed home strengthening with increasing level of difficulty with addressing endurance, increasing reps or weight  Exercises to improve mobility, strength and posture. Required minimal verbal cues to promote proper body posture and exercise technique.     Date:  20 Date:  20 Date:  20   Activity/Exercise Parameters Parameters Parameters      LTR 1.5# B ankles x30 with core and B scapular stabilization x25 B UE position:  90/90  Lower trap  midtrap  1.5# B ankles  x25 2# x25 2# x25 hands behind the head   B IR Seated controlled ecc 2x5 ea    Supine table press 2x5    Seated IR belly press against yellow tband 2x5 Seated lift offs  Supine resisted B belly press with controlled ecc Standing with belly press  green tband 2x10 Manual resisted Belly press in supine 2x10 manual resisted   B ER Seated against yellow tband 2x10   Standing green B 2x10 Blue neutral 2x10    90/90 green 2x10    Scaption green 2x10 Blue 2x10    Blue   2x10 L  x10 x 8 R    Blue 2x10   Chin tuck Supine into pillow x5  Seated x5 Supine after positioning of the head 2x8  Supine facilitation after head placement. Seated resisted yellow tband 2x60 sec    diagonal    B green 2x5  B green x10 ea        Treatment/Session Summary:    · Response to Treatment: low endurance in the right shoulder today. Good strength in the left. Feel pt's sleeping position may have aggravated the shoulder  · Communication/Consultation:  None today  · Equipment provided today:  None today  · Recommendations/Intent for next treatment session: Next visit will focus on HEP,strengthening. Pt to decrease attendance.  Ultrasound to the neck  Total Treatment Billable Duration:  40 minutes   PT Patient Time In/Time Out  Time In: 0900  Time Out: [address] [place] [address]    Future Appointments   Date Time Provider Jayda Carballo   9/15/2020  1:00 PM Hang [facility]   9/22/2020 11:00 AM JULIAN [facility]   9/29/2020  2:00 PM JULIAN [facility]   10/27/2020 11:00 AM Farrah Cabral MD [facility]

## 2020-09-15 ENCOUNTER — HOSPITAL ENCOUNTER (OUTPATIENT)
Dept: PHYSICAL THERAPY | Age: 40
Discharge: HOME | End: 2020-09-15
Payer: COMMERCIAL

## 2020-09-18 ENCOUNTER — APPOINTMENT (OUTPATIENT)
Dept: PHYSICAL THERAPY | Age: 40
End: 2020-09-18
Payer: COMMERCIAL

## 2020-09-22 ENCOUNTER — HOSPITAL ENCOUNTER (OUTPATIENT)
Dept: PHYSICAL THERAPY | Age: 40
Discharge: HOME | End: 2020-09-22
Payer: COMMERCIAL

## 2020-09-22 PROCEDURE — 97035 APP MDLTY 1+ULTRASOUND EA 15: CPT

## 2020-09-22 PROCEDURE — 97140 MANUAL THERAPY 1/> REGIONS: CPT

## 2020-09-22 NOTE — PROGRESS NOTES
Paulina Smith  : 1980  Payor: Greyson Rice / Plan: [address] / Product Type: PPO /  [address] at [facility] MIGUELITO ANDREWS  [address] [facility], [address], [place] [address].  [phone]   [phone]       OUTPATIENT PHYSICAL THERAPY: Daily Treatment Note 2020  Visit Count: 12     ICD-10: Treatment Diagnosis: Candelaria Marlon (M54.2), Pain in left shoulder (M25.512)  Precautions/Allergies: none/Other medication and Penicillin g   TREATMENT PLAN:  Effective Dates: 2020 TO 2020 (90 days). Frequency/Duration: 1 to 3 times a week for 90 Day(s) MEDICAL/REFERRING DIAGNOSIS:  neck pain cervical fusion with left shoulder pain  DATE OF ONSET:   Pain: 3 years ago  Surgery: 2020  REFERRING PHYSICIAN: Simba Vargas MD MD Orders: PT- evaluate and treat, modalities, core strengthening and stretching  Return MD Appointment: unsure       Pre-treatment Symptoms/Complaints:  Pt reports doing stretches and exercises which help after she works. Pain: Initial:   0 / 10 right,   6-7 /10 left Post Session: 0-1 after   Medications Last Reviewed:  20     Updated Objective Findings:   Observation: forward head with slight right tilt  Palpation: left cervical paraspinal inhibition, median nerve tension, tight lower cervical flexion  Strength: n/t                      TREATMENT:   Modalities( 10 minutes) continuous ultrasound to the neck and B rhomboid regions at 2.0 w/cm2 for mechanical effects on tissue prior to manual treatment   Manual treatment ( 30 minutes): manual cervical traction, lower cervical extn stretch, manual hold of head placement for static 10 sec chin tucks,  left subscap release, left upper limb nerve glides for median nerve with stabilization of the first rib  Therapeutic Exercise: (5 Minutes):  Reviewed head position for chin tucks in supine to be held for 10 sec holds. Exercises to improve mobility, strength and posture.   Required minimal verbal cues to promote proper body posture and exercise technique. 9/1/20 9/4/20 9/8/20   Activity/Exercise      LTR 1.5# B ankles  x25 2# x25 2# x25 hands behind the head   B IR Standing with belly press  green tband 2x10 Manual resisted Belly press in supine 2x10 manual resisted   B ER Standing green B 2x10 Blue neutral 2x10    90/90 green 2x10    Scaption green 2x10 Blue 2x10    Blue   2x10 L  x10 x 8 R    Blue 2x10   Chin tuck  Supine facilitation after head placement. Seated resisted yellow tband 2x60 sec    diagonal B green 2x5  B green x10 ea        Treatment/Session Summary:    · Response to Treatment: good response to treatment with facilitation of left cervical paraspinals. Pt advised to perform supine static hold chin tucks to maintain muscle activation. · Communication/Consultation:  None today  · Equipment provided today:  None today  · Recommendations/Intent for next treatment session: Next visit will focus on HEP,strengthening. Pt to decrease attendance with more focus on HEP.  Pt out of town next week  Total Treatment Billable Duration:  45  minutes   PT Patient Time In/Time Out  Time In: 1100  Time Out: [address] [place], PT    Future Appointments   Date Time Provider Jayda Carballo   10/6/2020  9:00 AM Hang [facility]   10/13/2020  9:00 AM JULIAN [facility]   10/27/2020 11:00 AM Natalie Weldon MD [facility]

## 2020-09-25 ENCOUNTER — APPOINTMENT (OUTPATIENT)
Dept: PHYSICAL THERAPY | Age: 40
End: 2020-09-25
Payer: COMMERCIAL

## 2020-10-06 ENCOUNTER — HOSPITAL ENCOUNTER (OUTPATIENT)
Dept: PHYSICAL THERAPY | Age: 40
Discharge: HOME | End: 2020-10-06
Payer: COMMERCIAL

## 2020-10-06 PROCEDURE — 97110 THERAPEUTIC EXERCISES: CPT

## 2020-10-06 PROCEDURE — 97035 APP MDLTY 1+ULTRASOUND EA 15: CPT

## 2020-10-06 NOTE — PROGRESS NOTES
Daniele Saravia  : 1980  Payor: [id number] / Plan: [address] / Product Type: PPO /  [address] at [facility] MIGUELITO ANDREWS  [address] [facility], [address], [address] [facility]  [phone]   [phone]       OUTPATIENT PHYSICAL THERAPY: Daily Treatment Note 10/6/2020  Visit Count: 13     ICD-10: Treatment Diagnosis: Sameul Lourdes (M54.2), Pain in left shoulder (M25.512)  Precautions/Allergies: none/Other medication and Penicillin g   TREATMENT PLAN:  Effective Dates: 2020 TO 2020 (90 days). Frequency/Duration: 1 to 3 times a week for 90 Day(s) MEDICAL/REFERRING DIAGNOSIS:  neck pain cervical fusion with left shoulder pain  DATE OF ONSET:   Pain: 3 years ago  Surgery: 2020  REFERRING PHYSICIAN: Thomas Graham MD MD Orders: PT- evaluate and treat, modalities, core strengthening and stretching  Return MD Appointment: unsure       Pre-treatment Symptoms/Complaints:  Pt reports doing stretches and exercises. Being sore last week form having to help move \"stuff\" to get carpet moved out and floors redone. Better this morning  Pain: Initial:   0-3 / 10 right,   7 /10 left Post Session:    Medications Last Reviewed:  10/6/20     Updated Objective Findings:   Observation: good posture  Palpation: left cervical paraspinal inhibition, left median and ulnar  nerve tension  Strength: n/t                      TREATMENT:   Modalities( 9 minutes) continuous ultrasound to the neck and B rhomboid regions at 2.0 w/cm2 for mechanical effects on tissue prior to manual treatment   Manual treatment (5  minutes): left median nerve glide stretches with stabilization of the first rib, left scalene release  Therapeutic Exercise: (29 Minutes):  Reviewed HEP with preparing for use of UE or heavy lifting with neck/upper trap stretching and nerve stretching for median and ulnar nerves. LTR muscle facilitation at scapular stabilizers- mid trap, low trap and rotator cuff.  Manual guidance of head position for chin tucks in supine to be held for 3x10 sec holds. Exercises to improve mobility, strength and posture. Required minimal verbal cues to promote proper body posture and exercise technique. 9/1/20 9/4/20 9/8/20   Activity/Exercise      LTR 1.5# B ankles  x25 2# x25 2# x25 hands behind the head   B IR Standing with belly press  green tband 2x10 Manual resisted Belly press in supine 2x10 manual resisted   B ER Standing green B 2x10 Blue neutral 2x10    90/90 green 2x10    Scaption green 2x10 Blue 2x10    Blue   2x10 L  x10 x 8 R    Blue 2x10   Chin tuck  Supine facilitation after head placement. Seated resisted yellow tband 2x60 sec    diagonal B green 2x5  B green x10 ea        Treatment/Session Summary:    · Response to Treatment: good response to treatment with Pt being better aware of aggravating factors and how to manage them. advised pt to continue with perform posture correction chin tucks to maintain muscle activation. · Communication/Consultation:  None today  · Equipment provided today:  None today  · Recommendations/Intent for next treatment session: Next visit will focus on HEP,strengthening. Pt with more focus on HEP.   Total Treatment Billable Duration:  43  minutes   PT Patient Time In/Time Out  Time In: 1568  Time Out: [address], PT    Future Appointments   Date Time Provider Jayda Carballo   10/13/2020  9:00 AM Tanya Staff, [place] [facility]   10/20/2020  9:00 AM Tanya Staff, PT [facility]   10/27/2020  9:00 AM Tanya Staff, PT [facility]   10/27/2020 11:00 AM Sandra Eid MD [facility]   11/10/2020  9:00 AM Tanya Staff, PT [facility]

## 2020-10-09 ENCOUNTER — HOSPITAL ENCOUNTER (OUTPATIENT)
Dept: PHYSICAL THERAPY | Age: 40
Discharge: HOME | End: 2020-10-09
Payer: COMMERCIAL

## 2020-10-09 PROCEDURE — 97110 THERAPEUTIC EXERCISES: CPT

## 2020-10-09 PROCEDURE — 97035 APP MDLTY 1+ULTRASOUND EA 15: CPT

## 2020-10-09 NOTE — PROGRESS NOTES
Antonia Altamirano  : 1980  Payor: Greyson Rice / Plan: [address] / Product Type: PPO /  [address] at [facility] MIGUELITO ANDREWS  [address] [facility], [address], [place] [address].  [phone]   [phone]       OUTPATIENT PHYSICAL THERAPY: Daily Treatment Note 10/9/2020  Visit Count: 14     ICD-10: Treatment Diagnosis: Asiya Guzman (M54.2), Pain in left shoulder (M25.512)  Precautions/Allergies: none/Other medication and Penicillin g   TREATMENT PLAN:  Effective Dates: 2020 TO 2020 (90 days). Frequency/Duration: 1 to 3 times a week for 90 Day(s) MEDICAL/REFERRING DIAGNOSIS:  neck pain cervical fusion with left shoulder pain  DATE OF ONSET:   Pain: 3 years ago  Surgery: 2020  REFERRING PHYSICIAN: Herberth Ordoñez MD MD Orders: PT- evaluate and treat, modalities, core strengthening and stretching  Return MD Appointment: unsure       Pre-treatment Symptoms/Complaints:  Pt reports doing stretches and exercises but having to use the arm so much at work brings the symptoms back. Pain: Initial:   0 / 10 right,   3-7 /10 left Post Session: \"so much better\"   Medications Last Reviewed:  10/6/20     Updated Objective Findings:   Observation: good posture  Palpation: left cervical paraspinal inhibition, left median  nerve tension  Strength: n/t                      TREATMENT:   Modalities( 9 minutes) continuous ultrasound to the neck and B rhomboid regions at 2.0 w/cm2 for mechanical effects on tissue prior to manual treatment   Manual treatment (5  minutes): left median nerve glide stretches with stabilization of the first and second ribs  Therapeutic Exercise: (28 Minutes):  Reviewed and issued HEP with standing median nerve glides and chin tucks against red tband. Emphasis on decreasing use of  neck/upper trap. Manual guidance of head position for chin tucks in supine to be held for 3x10 sec holds. Exercises to improve mobility, strength and posture.   Required minimal verbal cues to promote proper body posture and exercise technique. 9/1/20 9/4/20 9/8/20    Activity/Exercise       LTR 1.5# B ankles  x25 2# x25 2# x25 hands behind the head    B IR Standing with belly press  green tband 2x10 Manual resisted Belly press in supine 2x10 manual resisted    B ER Standing green B 2x10 Blue neutral 2x10    90/90 green 2x10    Scaption green 2x10 Blue 2x10    Blue   2x10 L  x10 x 8 R    Blue 2x10    Chin tuck  Supine facilitation after head placement. Seated resisted yellow tband 2x60 sec  Supine 5 sec hold x5 once head positioned   diagonal B green 2x5  B green x10 ea         Treatment/Session Summary:    · Response to Treatment: good awareness of aggravating factors. chin tucks to maintain left cervical muscle activation. · Communication/Consultation:  None today  · Equipment provided today:  None today  · Recommendations/Intent for next treatment session: Next visit will focus on HEP modification as needed, manual treatment,strengthening.  assess nerve glide  Total Treatment Billable Duration:  42  minutes   PT Patient Time In/Time Out  Time In: 0910  Time Out: [address], PT    Future Appointments   Date Time Provider Jayda Carballo   10/13/2020  9:00 AM Hang Venegas   10/20/2020  9:00 AM JULIAN Venegas   10/27/2020  9:00 AM JULIAN Venegas   10/27/2020 11:00 AM Chris Smith MD [facility]   11/10/2020  9:00 AM JULIAN Venegas

## 2020-10-13 ENCOUNTER — HOSPITAL ENCOUNTER (OUTPATIENT)
Dept: PHYSICAL THERAPY | Age: 40
Discharge: HOME | End: 2020-10-13
Payer: COMMERCIAL

## 2020-10-13 PROCEDURE — 97035 APP MDLTY 1+ULTRASOUND EA 15: CPT

## 2020-10-13 PROCEDURE — 97140 MANUAL THERAPY 1/> REGIONS: CPT

## 2020-10-13 PROCEDURE — 97110 THERAPEUTIC EXERCISES: CPT

## 2020-10-13 NOTE — PROGRESS NOTES
Jamarcus Andrade  : 1980  Payor: [id number] / Plan: [address] / Product Type: PPO /  [address]  at [facility] MIGUELITO ANDREWS  [address] [place] [address], [address], [place] [address].  [phone]   [phone]       OUTPATIENT PHYSICAL THERAPY: Daily Treatment Note 10/13/2020  Visit Count: 15     ICD-10: Treatment Diagnosis: Ines Alfonso (M54.2), Pain in left shoulder (M25.512)  Precautions/Allergies: none/Other medication and Penicillin g   TREATMENT PLAN:  Effective Dates: 2020 TO 2020 (90 days). Frequency/Duration: 1 to 3 times a week for 90 Day(s) MEDICAL/REFERRING DIAGNOSIS:  neck pain cervical fusion with left shoulder pain  DATE OF ONSET:   Pain: 3 years ago  Surgery: 2020  REFERRING PHYSICIAN: Tom Collado MD MD Orders: PT- evaluate and treat, modalities, core strengthening and stretching  Return MD Appointment: unsure       Pre-treatment Symptoms/Complaints:  Pt reports doing the exercises. They make a difference. How she sits to watch tv makes a difference too. Pain: Initial:   0-2/ 10 right,   5 /10 left Post Session: 0-right, 1-2 on left   Medications Last Reviewed:  10/13/20     Updated Objective Findings:   Observation: good posture  Palpation: less left cervical paraspinal inhibition, significant left median nerve tension at end ROM glide  Strength: n/t                      TREATMENT:   Modalities( 9 minutes) continuous ultrasound to the neck and lefft rhomboid regions at 2.0 w/cm2 for mechanical effects on tissue prior to manual treatment   Manual treatment (10  minutes): left median nerve glide stretches with stabilization of the first and second ribs, manual cervical traction with right side bend stretch, left subscap release  Therapeutic Exercise: (25 Minutes):  Reviewed HEP standing median nerve glides to go further into pull.  Manual guidance of head position and tapping for facilitation of left paraspinal chin tucks in supine held for 3x10 sec holds. Exercises to improve mobility, strength and posture. Required minimal verbal cues to promote proper body posture and exercise technique. 9/1/20 9/4/20 9/8/20 10/9/20 10/13/20    Activity/Exercise         LTR 1.5# B ankles  x25 2# x25 2# x25 hands behind the head      B IR Standing with belly press  green tband 2x10 Manual resisted Belly press in supine 2x10 manual resisted  Left manual resisted belly press and table press with facilitation of subscap    B ER Standing green B 2x10 Blue neutral 2x10    90/90 green 2x10    Scaption green 2x10 Blue 2x10    Blue   2x10 L  x10 x 8 R    Blue 2x10      Chin tuck  Supine facilitation after head placement. Seated resisted yellow tband 2x60 sec  Supine 5 sec hold x5 once head positioned Supine 3x10 sec hold with facilitation of left paraspinal muscle    diagonal B green 2x5  B green x10 ea           Treatment/Session Summary:    · Response to Treatment: good awareness of aggravating factors. End range nerve tension needs to be stretched. Better muscle activation of cervical paraspinal and subscap on the left after treatment. · Communication/Consultation:  None today  · Equipment provided today:  None today  · Recommendations/Intent for next treatment session: Next visit will focus on HEP modification as needed, manual treatment,strengthening.    Total Treatment Billable Duration:  44  minutes   PT Patient Time In/Time Out  Time In: 0900  Time Out: [address] [place], PT    Future Appointments   Date Time Provider Jayda Carballo   10/20/2020  9:00 AM Hang [facility]   10/27/2020  9:00 AM JULIAN [facility]   10/27/2020 11:00 AM Chalino Rosado [facility]   11/10/2020  9:00 AM [facility]

## 2020-10-20 ENCOUNTER — HOSPITAL ENCOUNTER (OUTPATIENT)
Dept: PHYSICAL THERAPY | Age: 40
Discharge: HOME | End: 2020-10-20
Payer: COMMERCIAL

## 2020-10-20 PROCEDURE — 97140 MANUAL THERAPY 1/> REGIONS: CPT

## 2020-10-20 PROCEDURE — 97035 APP MDLTY 1+ULTRASOUND EA 15: CPT

## 2020-10-20 NOTE — PROGRESS NOTES
Marilyn Baptist  : 1980  Payor: [facility] Rice / Plan: [address] / Product Type: PPO /  [address]  at [facility] MIGUELITO ANDREWS  [address] [place] [address], [address], [place] [address].  [phone]   [phone]       OUTPATIENT PHYSICAL THERAPY: Daily Treatment Note 10/20/2020  Visit Count: 16     ICD-10: Treatment Diagnosis: Virgin Danielle (M54.2), Pain in left shoulder (M25.512)  Precautions/Allergies: none/Other medication and Penicillin g   TREATMENT PLAN:  Effective Dates: 2020 TO 2020 (90 days). Frequency/Duration: 1 to 3 times a week for 90 Day(s) MEDICAL/REFERRING DIAGNOSIS:  neck pain cervical fusion with left shoulder pain  DATE OF ONSET:   Pain: 3 years ago  Surgery: 2020  REFERRING PHYSICIAN: Clari Peguero MD MD Orders: PT- evaluate and treat, modalities, core strengthening and stretching  Return MD Appointment: unsure       Pre-treatment Symptoms/Complaints:  Pt reports doing the exercises. Feeling better than last week. Pain: Initial:   0-3/ 10 right,   0-5 /10 left Post Session: that helps so much   Medications Last Reviewed:  10/20/20     Updated Objective Findings:   Observation: good posture  Palpation: less left cervical paraspinal inhibition, left left median nerve tension   Strength:   LUE Strength  L Shoulder Flexion: 4  L Shoulder Internal Rotation: 4+(subscap)  L Elbow Extension: 4+  L Wrist Extension: 4+                   TREATMENT:   Modalities( 9 minutes) continuous ultrasound to the neck and left rhomboid regions at 2.0 w/cm2 for mechanical effects on tissue prior to manual treatment   Manual treatment (30  minutes): left median nerve glide stretches with stabilization of the first and second rib, manual cervical traction with right side bend stretch, left subscap release with stretch, left pect minor stretch, right scalene release. Kinesio tape to inhibit left upper trap.   Therapeutic Exercise: ( ): none     9/1/20 9/4/20 9/8/20 10/9/20 10/13/20    Activity/Exercise         LTR 1.5# B ankles  x25 2# x25 2# x25 hands behind the head      B IR Standing with belly press  green tband 2x10 Manual resisted Belly press in supine 2x10 manual resisted  Left manual resisted belly press and table press with facilitation of subscap    B ER Standing green B 2x10 Blue neutral 2x10    90/90 green 2x10    Scaption green 2x10 Blue 2x10    Blue   2x10 L  x10 x 8 R    Blue 2x10      Chin tuck  Supine facilitation after head placement. Seated resisted yellow tband 2x60 sec  Supine 5 sec hold x5 once head positioned Supine 3x10 sec hold with facilitation of left paraspinal muscle    diagonal B green 2x5  B green x10 ea           Treatment/Session Summary:    · Response to Treatment: good compliance with recommendations. Pt to keep tape on for up to 3 days unless skin becomes irritated. · Communication/Consultation:  None today  · Equipment provided today:  None today  · Recommendations/Intent for next treatment session: Next visit will focus on HEP modification or upgrade as needed, manual treatment,strengthening.    Total Treatment Billable Duration:  39  minutes   PT Patient Time In/Time Out  Time In: 3560  Time Out: [address]la Cortez [address], PT    Future Appointments   Date Time Provider Jayda Carballo   10/27/2020  9:00 AM Hang [facility]   10/27/2020 11:00 AM Patricia Trevino MD [facility]   11/10/2020  9:00 AM JULIAN [facility]

## 2020-10-27 ENCOUNTER — HOSPITAL ENCOUNTER (OUTPATIENT)
Dept: PHYSICAL THERAPY | Age: 40
Discharge: HOME | End: 2020-10-27
Payer: COMMERCIAL

## 2020-10-27 PROBLEM — M48.02 CERVICAL SPINAL STENOSIS: Status: RESOLVED | Noted: 2020-04-08 | Resolved: 2020-10-27

## 2020-10-27 PROCEDURE — 97140 MANUAL THERAPY 1/> REGIONS: CPT

## 2020-10-27 PROCEDURE — 97035 APP MDLTY 1+ULTRASOUND EA 15: CPT

## 2020-11-05 ENCOUNTER — HOSPITAL ENCOUNTER (OUTPATIENT)
Dept: PHYSICAL THERAPY | Age: 40
Discharge: HOME | End: 2020-11-05
Payer: COMMERCIAL

## 2020-11-05 PROCEDURE — 97110 THERAPEUTIC EXERCISES: CPT

## 2020-11-05 PROCEDURE — 97140 MANUAL THERAPY 1/> REGIONS: CPT

## 2020-11-05 NOTE — PROGRESS NOTES
Hiro Amos  : 1980  Payor: [address] / Plan: [address] / Product Type: PPO /  [address] at [facility] MIGUELITO ANDREWS  [address] [facility], [address], [address] [facility]  [phone]   [phone]       OUTPATIENT PHYSICAL THERAPY: Daily Treatment Note 2020  Visit Count: 18     ICD-10: Treatment Diagnosis: Tj Senor (M54.2), Pain in left shoulder (M25.512)  Precautions/Allergies: none/Other medication and Penicillin g   TREATMENT PLAN:  Effective Dates: 2020 TO 2020 (90 days). Frequency/Duration: 1 to 3 times a week for 90 Day(s) MEDICAL/REFERRING DIAGNOSIS:  neck pain cervical fusion with left shoulder pain  DATE OF ONSET:   Pain: 3 years ago  Surgery: 2020  REFERRING PHYSICIAN: Fredi Nguyen MD MD Orders: PT- evaluate and treat, modalities, core strengthening and stretching  Return MD Appointment: unsure       Pre-treatment Symptoms/Complaints:  Pt reports the tape helped but didn't stay on except for two days. Asked about seating in new car for head positioning  Pain: Initial:  0 / 10 right,   4-5 /10 left Post Session: 3-4 in the left only   Medications Last Reviewed:  20 stopped elequis    Updated Objective Findings:   Observation: forward head when in the car  Palpation: tight B upper limb median nerve at end range  Strength: n/t                      TREATMENT:   Modalities(  Minutes) none    Manual treatment (22  minutes): left median nerve glide stretches with stabilization of the first and at 0 deg horizontal abducted shoulder position with right neck side bend and right cervical rotation to get full nerve gliding, manual cervical traction with left scalene stretch, grade 4- left 1st rib mobilizations,left pect minor stretch. Therapeutic Exercise: (20 Minutes): reviewed self left median nerve glides with pt supine , no pillow and at end range 90 deg ER and horizontal abd.  Instructed pt on trying supine cervical right side bend and rotation during nerve glides to get more proximal nerve. Instructed pt on sitting in car with pillow behind her back so that she can have a more neutral neck against the head rest        10/9/20 10/13/20    Activity/Exercise      LTR      B IR  Left manual resisted belly press and table press with facilitation of subscap    B ER      Chin tuck Supine 5 sec hold x5 once head positioned Supine 3x10 sec hold with facilitation of left paraspinal muscle    diagonal           Treatment/Session Summary:    · Response to Treatment: good response to treatment with pain relief and good awareness of aggravating factors. Pt advised to monitor extreme postioning of the head in the car  · Communication/Consultation:  None today  · Equipment provided today:  None today  · Recommendations/Intent for next treatment session: Next visit will focus on HEP modification or upgrade as needed, manual treatment,strengthening.  Assess goals and taping upper trap next visit  Total Treatment Billable Duration: 42   minutes   PT Patient Time In/Time Out  Time In: 0945  Time Out: [address], PT    Future Appointments   Date Time Provider Jayda Carballo   11/10/2020  9:00 AM Hang Foster [facility]   12/23/2020  8:45 AM [facility] LAB [facility] CAF   12/29/2020 11:00 AM Derrick Rodrigues MD [facility] CAF

## 2020-11-10 ENCOUNTER — HOSPITAL ENCOUNTER (OUTPATIENT)
Dept: PHYSICAL THERAPY | Age: 40
Discharge: HOME | End: 2020-11-10
Payer: COMMERCIAL

## 2020-11-10 PROCEDURE — 97110 THERAPEUTIC EXERCISES: CPT

## 2020-11-10 NOTE — PROGRESS NOTES
Daniele Saravia  : 1980  Payor: [address] / Plan: [address] / Product Type: PPO /  [address] at [facility] MIGUELITO ANDREWS  [address] [facility], [address], [place] [address].  [phone]   [phone]       OUTPATIENT PHYSICAL THERAPY: Daily Treatment Note 11/10/2020  Visit Count: 19     ICD-10: Treatment Diagnosis: Sameul Lourdes (M54.2), Pain in left shoulder (M25.512)  Precautions/Allergies: none/Other medication and Penicillin g   TREATMENT PLAN:  Effective Dates: 2020 TO 2020 (90 days). Frequency/Duration: 1 to 3 times a week for 90 Day(s) MEDICAL/REFERRING DIAGNOSIS:  neck pain cervical fusion with left shoulder pain  DATE OF ONSET:   Pain: 3 years ago  Surgery: 2020  REFERRING PHYSICIAN: Thomas Graham MD MD Orders: PT- evaluate and treat, modalities, core strengthening and stretching  Return MD Appointment: unsure       Pre-treatment Symptoms/Complaints:  Pt reports feeling good except on that left side. Doing the HEP. Felt \"electricity\" pain in the left shoulder after running a short distance yesterday.   Pain: Initial:  0 / 10 right,   4-5 /10 left Post Session: 4/10 left   Medications Last Reviewed:  11/10/20     Updated Objective Findings:   Observation: good posture correction  Palpation: not tender over the left upper trap or scapular region  Strength:   LUE Strength  L Shoulder Flexion: 4  L Shoulder ABduction: 4+  L Shoulder Horizontal ABduction: 4  L Shoulder Internal Rotation: 5(subscap 4)  L Shoulder External Rotation: 5  L Elbow Flexion: 4+  L Elbow Extension: 4+              ROM: BUE WNL  Cervical Flexion (% of Normal): 75  Cervical Left Rotation (% of Normal): 50  Cervical Right Rotation (% of Normal): 50                                      TREATMENT:   Modalities(  Minutes) none    Manual treatment (  Minutes):none   Therapeutic Exercise: (40 Minutes): reviewed self ulnar and median nerve glides supine, seated, against the wall and in the car; HEP with prone low trap, mid trap; standing tricep pulls; seated ER and belly press IR. Discussed performing posture correction and core exercises from early on in therapy. 10/9/20 10/13/20    Activity/Exercise      LTR      B IR  Left manual resisted belly press and table press with facilitation of subscap    B ER      Chin tuck Supine 5 sec hold x5 once head positioned Supine 3x10 sec hold with facilitation of left paraspinal muscle    diagonal           Treatment/Session Summary:    · Response to Treatment: good return demonstration of home program and awareness of aggravating factors   · Communication/Consultation:  None today  · Equipment provided today:  None today  Recommendations: discharge to [facility].    Total Treatment Billable Duration: 40   minutes   PT Patient Time In/Time Out  Time In: 0900  Time Out: [address] [place], PT    Future Appointments   Date Time Provider Jayda Carballo   12/23/2020  8:45 AM [facility] LAB [facility]   12/29/2020 11:00 AM Ruth Noyola MD [facility]

## 2020-11-10 NOTE — THERAPY DISCHARGE
Antonia Evelia  : 1980  [facility]  Secondary:  [address]  at [facility] MIGUELITO ANDREWS  [address] [facility], [address] [place] [address] [address], [address] [facility]  [phone]   [phone]       OUTPATIENT PHYSICAL THERAPY:Discharge Summary 11/10/2020   ICD-10: Treatment Diagnosis: Asiya Guzman (M54.2), Pain in left shoulder (M25.512)  Precautions/Allergies: none/Other medication and Penicillin g    PLAN: discharge to [facility]   MEDICAL/REFERRING DIAGNOSIS:  neck pain , left shoulder pain  DATE OF ONSET:   pain:3 years ago  Surgery:   REFERRING PHYSICIAN: Herberth Ordoñez MD MD Orders: PT-evaluate and treat, modalities, core strengthening, stretching  Return MD Appointment: unsure      ASSESSMENT:  Ms. Titus Escalera came to therapy s/p C4-5 fusion surgery due to disc protrusion. She demonstrates AROM of the neck and shoulder WNL. Left shoulder has pain after activity or over use of the right arm. Pt demonstrates good compliance with recommendations and awareness of aggravating factors and ability to make modifications. Nerve tension is significantly improved and only on the left, responds well to treatment. She has occasional mild headaches after activity. There is no longer numbness but occasional pain in the left palm or fingers. She presents with great posture. Still with some left scapular dyskinesia when having radicular symptoms or fatigue after activity. negative thoracic outlet syndrome test. Goals are mostly met and anticipate further gains with home program compliance. GOALS:   Short-Term Functional Goals: Time Frame: 3 weeks     1. Independent with HEP- met     2. Cervical rotation AROM WNL to allow observation of traffic when driving-met     3. Pt to demonstrate posture correction without cueing-met  Discharge Goals: Time Frame: 90 days     1. Tolerate activity > 45 minutes for job duties and home care-  met     2.  ADL's with pain < 3/10 not fully met in the left arm and neck, met in the right     3. LUE Strength improved to at least 4+/5 to allow safe reaching/lifting- met except for left subscap    OUTCOME MEASURE:   Tool Used: Neck Disability Index (NDI)  Score:  Initial: 14/50  Most Recent: 10/50 (Date: 9/9/20 )  11/50 (date 11/10/20)   Interpretation of Score: The Neck Disability Index is a revised form of the Oswestry Low Back Pain Index and is designed to measure the activities of daily living in person's with neck pain. Each section is scored on a 0-5 scale, 5 representing the greatest disability. The scores of each section are added together for a total score of 50. PAIN/SUBJECTIVE:   Initial:    Right 0/10,  Left 2-5 /10 pt reports pain today from trying to run yesterday. Post Session:  4/10 left only   HISTORY:   History of Injury/Illness (Reason for Referral):  Pt reports having a gradual increase in neck and B shoulder pain over the past 3 years thinking it was from her job as a . When chiropractic adjustments didn't help she opted for surgery. Past Medical History/Comorbidities:   Ms. Branden Reveles  has a past medical history of Anemia, Asthma, Cervical myelopathy ([facility] [address] (4/8/2020), Family history of cardiomyopathy, Impaired fasting blood sugar (7/11/2017), and Vitamin D deficiency (6/10/2016). Ms. Branden Reveles  has no past surgical history on file. Social History/Living Environment:     pt lives with  and children in a two story home. Prior Level of Function/Work/Activity:  Works as a  working for at least an hour with each client. Cares for the home, laundry and shopping. Personal Factors:          Coping Style:  Fear of pain when moving             EXAMINATION:   Observation/Orthostatic Postural Assessment:         Good posture correction. Palpation:                  ROM: BUE and neck WNL.   Cervical Flexion (% of Normal): 75  Cervical Left Rotation (% of Normal): 50  Cervical Right Rotation (% of Normal): 50 Strength: RUE 5/5. LUE when fatigued:  LUE Strength  L Shoulder Flexion: 4  L Shoulder ABduction: 4+  L Shoulder Horizontal ABduction: 4  L Shoulder Internal Rotation: 5(subscap 4)  L Shoulder External Rotation: 5  L Elbow Flexion: 4+  L Elbow Extension: 4+                        Special Tests:          Negative R upper limb median nerve tension for median and ulnar nerves.          negative thoracic Outlet Syndrome test

## 2020-12-29 PROBLEM — R71.8 LOW MEAN CORPUSCULAR VOLUME (MCV): Status: ACTIVE | Noted: 2020-12-29

## 2021-01-05 ENCOUNTER — TRANSCRIBE ORDER (OUTPATIENT)
Dept: SCHEDULING | Age: 41
End: 2021-01-05

## 2021-01-05 DIAGNOSIS — Z12.31 VISIT FOR SCREENING MAMMOGRAM: Primary | ICD-10-CM

## 2021-01-22 ENCOUNTER — HOSPITAL ENCOUNTER (OUTPATIENT)
Dept: MAMMOGRAPHY | Age: 41
Discharge: HOME | End: 2021-01-22
Attending: INTERNAL MEDICINE
Payer: COMMERCIAL

## 2021-01-22 DIAGNOSIS — Z12.31 VISIT FOR SCREENING MAMMOGRAM: ICD-10-CM

## 2021-01-22 PROCEDURE — 77067 SCR MAMMO BI INCL CAD: CPT

## 2021-02-09 ENCOUNTER — HOSPITAL ENCOUNTER (OUTPATIENT)
Dept: PHYSICAL THERAPY | Age: 41
Discharge: HOME | End: 2021-02-09
Payer: COMMERCIAL

## 2021-02-09 PROCEDURE — 97110 THERAPEUTIC EXERCISES: CPT

## 2021-02-09 NOTE — PROGRESS NOTES
Jesus Blue  : 1980  Payor: [address] / Plan: [address] / Product Type: PPO /  [address] at [facility] MIGUELITO ANDREWS  [address] [facility], [address], [place] [address].  [phone]   [phone]       OUTPATIENT PHYSICAL THERAPY: Daily Treatment Note 2021  Visit Count: 1     ICD-10: Treatment Diagnosis: Karen Boston (M54.2), Pain in left shoulder (M25.512)  Precautions/Allergies: none/Other medication and Penicillin g   TREATMENT PLAN:  Effective Dates: 21 to 21 (60 days). 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  mentioned surgery but don't want another fusion. 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 Added B shoulder ER against red tband.      10/9/20 10/13/20    Activity/Exercise      LTR      B IR  Left manual resisted belly press and table press with facilitation of subscap    B ER      Chin tuck Supine 5 sec hold x5 once head positioned Supine 3x10 sec hold with facilitation of left paraspinal muscle    diagonal           Treatment/Session Summary:    · Response to Treatment: good return demonstration of exercise  · Communication/Consultation:  None today  · Equipment provided today:  None today  · Recommendations/Intent for next treatment session: Next visit will focus on manual treatment, HEP review and modification.  Assess taping upper trap next visit  Total Treatment Billable Duration: 28   minutes   PT Patient Time In/Time Out  Time In: 1115  Time Out: [address] PT    Future Appointments   Date Time Provider Jayda Carballo   2/16/2021 11:15 AM Ángel Fail, PT [facility]   2/23/2021 10:30 AM Ángel Fail, PT [facility]   3/1/2021  9:00 AM Ángel Fail, PT [facility]   3/3/2021  9:00 AM Ángel Fail, PT [facility]   3/8/2021  9:00 AM Ángel Fail, PT [facility]   3/10/2021  9:00 AM Ángel Fail, PT [facility]   3/17/2021  9:00 AM Ángel Fail, PT [facility]   3/22/2021  9:00 AM Ángel Fail, PT [facility]   3/24/2021  9:00 AM Ángel Fail, PT [facility]

## 2021-02-09 NOTE — THERAPY RECERTIFICATION
Hiram Nick : 1980 Primary: [address] Secondary:  [facility] MIGUELITO ANDREWS [address] [facility], [address] [place] [address],[address], [place] [address]. [phone]   [phone] OUTPATIENT PHYSICAL THERAPY:Recertification [id number] ICD-10: Treatment Diagnosis: Cervalgia (M54.2), Pain in left shoulder (M25.512) Precautions/Allergies: cervical fusion/Other medication and Penicillin g  
TREATMENT PLAN: 
Effective Dates: 2021 TO 2021 (60 days). Frequency/Duration: 1 to 3 times a week for 60 Day(s) MEDICAL/REFERRING DIAGNOSIS: 
neck pain left shoulder pain DATE OF ONSET:  
 pain:3 years ago Surgery:  REFERRING PHYSICIAN: Marci MONROY MD Orders: PT- evaluate and treat Return MD Appointment: 21 INITIAL ASSESSMENT:  Ms. Darin Negron returns to therapy with recurring neck and left arm nerve pain from job duties. She presents with significant upper limb median and ulnar nerve tension with symptoms to the left hand, compensatory overuse of the upper trap, weakness of the subscapularis muscle and new weakness of the left rotator cuff. She wants to avoid any further surgery. 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 She responded very well to therapy before and should have good results with compliance. She will benefit from skilled therapy to address deficits and review HEP to allow the return to functional ADL's with less pain. PROBLEM LIST (Impacting functional limitations): 1. Decreased Strength 2. Decreased ADL/Functional Activities 3. Increased Pain 4. Decreased Activity Tolerance INTERVENTIONS PLANNED: (Treatment may consist of any combination of the following) 1. Heat 2. Home Exercise Program (HEP) 3. Manual Therapy 4. Neuromuscular Re-education/Strengthening 5. Range of Motion (ROM) 6. Therapeutic Exercise/Strengthening 7. ultrasound GOALS: (Goals have been discussed and agreed upon with patient.) Discharge Goals: Time Frame: 60 days 1. Independent with HEP 2. L shoulder AROM WNL without nerve pain to allow ADL's 3. L shoulder Strength 5/5 to allow home care without compensation 4. Decrease Neck Disability Index by at least 5 points to show improved function 5. ADL's with pain < 3/10 OUTCOME MEASURE:  
Tool Used: Neck Disability Index (NDI) Score:  Initial: 17/50  Most Recent: X/50 (Date: -- ) Interpretation of Score: The Neck Disability Index is a revised form of the Oswestry Low Back Pain Index and is designed to measure the activities of daily living in person's with neck pain. Each section is scored on a 0-5 scale, 5 representing the greatest disability. The scores of each section are added together for a total score of 50. MEDICAL NECESSITY:  
· Skilled intervention continues to be required due to need for manual treatment before progression of exercise. REASON FOR SERVICES/OTHER COMMENTS: 
· Patient continues to require skilled intervention due to need for guided progression into exercises. Total Duration: 60 minutes PT Patient Time In/Time Out Time In: 1115 Time Out: 3154 Rehabilitation Potential For Stated Goals: Good Regarding Lilli Mclaughlin therapy, I certify that the treatment plan above will be carried out by a therapist or under their direction. Thank you for this referral, Burton Abraham, PT,MSPT Referring Physician Signature: David REYES* _______________________________ Date _____________ PAIN/SUBJECTIVE:  
Initial:   3-7/10 Post Session:  2-3/10 HISTORY:  
History of Injury/Illness (Reason for Referral): 
 Pt reports having a gradual increase in neck and B shoulder pain over the past 3 years thinking it was from her job as a . When chiropractic adjustments didn't help she opted for surgery. She was able to perform the HEP as recommended but performing job duties made neck and nerve pain much worse. Past Medical History/Comorbidities: Ms. Mack Harada  has a past medical history of cervical fusion 4/2020, Cervical myelopathy, Family history of cardiomyopathy, Impaired fasting blood sugar (7/11/2017), and Vitamin D deficiency (6/10/2016). Social History/Living Environment:  
  pt lives with  and children in a two story home. Prior Level of Function/Work/Activity: 
Works as a  working 1-3 hours with each client. Cares for the home, laundry and shopping. Personal Factors:   
          
Previous Treatment Approaches:   
      PT Nov 2020 for post-op neck fusion Ambulatory/Rehab Services H2 Model Falls Risk Assessment Risk Factors: 
     No Risk Factors Identified Ability to Rise from Chair: 
     (0)  Ability to rise in a single movement Falls Prevention Plan: No modifications necessary Total: (5 or greater = High Risk): 0  
©2010 [facility] [place] [address] [place] [address] States Patent #[id number]. Federal Law prohibits the replication, distribution or use without written permission from [facility] of [address] [place] [address] Current Medications:   
  
Current Outpatient Medications:  
  montelukast (SINGULAIR) 10 mg tablet, Take 1 Tab by mouth daily. , Disp: 90 Tab, Rfl: 3 
  budesonide-formoteroL (Symbicort) 160-4.5 mcg/actuation HFAA, Take 2 Puffs by inhalation two (2) times a day., Disp: 3 Inhaler, Rfl: 4 
  albuterol (PROVENTIL HFA, VENTOLIN HFA, PROAIR HFA) 90 mcg/actuation inhaler, Take 2 Puffs by inhalation every four (4) hours as needed for Wheezing., Disp: 1 Inhaler, Rfl: 3   acetaminophen (Tylenol Extra Strength) 500 mg tablet, Take 1,000 mg by mouth every six (6) hours as needed for Pain., Disp: , Rfl:   Ibuprofen   cetirizine (ZYRTEC) 10 mg tablet, Take 1 Tab by mouth daily. , Disp: 20 Tab, Rfl: 0 Date Last Reviewed:  2/9/21 Number of Personal Factors/Comorbidities that affect the Plan of Care: 1-2: MODERATE COMPLEXITY EXAMINATION:  
Observation/Orthostatic Postural Assessment:   
      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   
      Tender over the left levator scap region, left 1st rib, left pect minor, left subscap ROM:   
      Neck WFL, BUE WNL Strength: LUE Strength L Shoulder Flexion: 4- 
L Shoulder ABduction: 4 L Shoulder Internal Rotation: (subscap 3/5) L Shoulder External Rotation: 4-(to 3+ at neutral, scaptin and 4-/5 at 90/90 position) Special tests: positive ULTT for median and ulnar nerves Body Structures Involved: 1. Nerves 2. Bones 3. Joints 4. Muscles Body Functions Affected: 1. Sensory/Pain 2. Neuromusculoskeletal 
3. Movement Related Activities and Participation Affected: 1. General Tasks and Demands 2. Domestic Life 3. job Number of elements (examined above) that affect the Plan of Care: 3: MODERATE COMPLEXITY CLINICAL PRESENTATION:  
Presentation: Evolving clinical presentation with changing clinical characteristics: MODERATE COMPLEXITY CLINICAL DECISION MAKING:  
Use of outcome tool(s) and clinical judgement create a POC that gives a: Questionable prediction of patient's progress: MODERATE COMPLEXITY

## 2021-02-16 ENCOUNTER — HOSPITAL ENCOUNTER (OUTPATIENT)
Dept: PHYSICAL THERAPY | Age: 41
Discharge: HOME | End: 2021-02-16
Payer: COMMERCIAL

## 2021-02-16 PROCEDURE — 97110 THERAPEUTIC EXERCISES: CPT

## 2021-02-16 PROCEDURE — 97140 MANUAL THERAPY 1/> REGIONS: CPT

## 2021-02-16 PROCEDURE — 97035 APP MDLTY 1+ULTRASOUND EA 15: CPT

## 2021-02-16 NOTE — PROGRESS NOTES
Sigrid Blanco  : 1980  Payor: [id number] / Plan: [address] / Product Type: PPO /  [address] at [facility] MIGUELITO ANDREWS  [address] [facility], [address], [address] [facility]  [phone]   [phone]       OUTPATIENT PHYSICAL THERAPY: Daily Treatment Note 2021  Visit Count: 2     ICD-10: Treatment Diagnosis: Neli Yakima (M54.2), Pain in left shoulder (M25.512)  Precautions/Allergies: none/Other medication and Penicillin g   TREATMENT PLAN:  Effective Dates: 21 to 21 (60 days). Frequency/Duration: 1 to 3 times a week for 60 Day(s) MEDICAL/REFERRING DIAGNOSIS:  neck pain cervical fusion with left shoulder pain  DATE OF ONSET:   Pain: 3 years ago  Surgery: 2020  REFERRING PHYSICIAN: Kassandra Ramos*  MD Orders: PT- evaluate and treat, modalities, core strengthening and stretching  Return MD Appointment: unsure       Pre-treatment Symptoms/Complaints:  Pt reports the pain is about the same. Did not do the exercises much. Working on hair makes it worse  Pain: Initial:  0-1 / 10 right,   5 /10 left Post Session: no number given   Medications Last Reviewed:  21    Updated Objective Findings:     Observation: forward and elevated left shoulder, use of upper trap with reaching  Palpation: tight L upper limb median nerve, elevated left 1st and 2nd ribs. Tender over the left pect minor   Strength: n/t                      TREATMENT:   Modalities(9  Minutes) continuous ultrasound at 2.0 w/cm2 to the left upper trap and cervical region for mechanical effects prior to manual treatment. Kinesio taping for left upper trap inhibition and 1st rib depression. Pt to wear up to 3 days unless skin becomes irritated.  Remove with lotion  Manual treatment ( 10 minutes): grade 4-- R C5 AP glides; stretching of upper cervical extn; left pect minor and scalene release  Therapeutic Exercise: (25 Minutes):  left median nerve glides with stabilization of the left 1st and 2nd ribs, right median nerve glides with stabilization of the 1st rib. Practiced chin tuck in supine without pillow with the addition of scapular retraction and depression. 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 10/9/20 10/13/20 2/16/21   Activity/Exercise      LTR      B IR  Left manual resisted belly press and table press with facilitation of subscap    B ER   Red tband 2x10   Chin tuck Supine 5 sec hold x5 once head positioned Supine 3x10 sec hold with facilitation of left paraspinal muscle Supine without pillow after manual treatment   diagonal           Treatment/Session Summary:    · Response to Treatment: good return demonstration of exercise  · Communication/Consultation:  None today  · Equipment provided today:  None today  · Recommendations/Intent for next treatment session: Next visit will focus on manual treatment, HEP review and modification.  Assess subscap  Total Treatment Billable Duration: 44   minutes   PT Patient Time In/Time Out  Time In: 1115  Time Out: [address] [facility],     Future Appointments   Date Time Provider Jayda Carballo   2/23/2021 10:30 AM Ryan Smoker, PT [facility]   3/1/2021  9:00 AM Ryan Smoker, PT [facility]   3/3/2021  9:00 AM Ryan Smoker, PT [facility]   3/8/2021  9:00 AM Ryan Smoker, PT [facility]   3/10/2021  9:00 AM Ryan Smoker, PT [facility]   3/17/2021  9:00 AM Ryan Smoker, PT [facility]   3/22/2021  9:00 AM Ryan Smoker, PT [facility]   3/24/2021  9:00 AM Ryan Smoker, PT [facility]

## 2021-02-23 ENCOUNTER — HOSPITAL ENCOUNTER (OUTPATIENT)
Dept: PHYSICAL THERAPY | Age: 41
Discharge: HOME | End: 2021-02-23
Payer: COMMERCIAL

## 2021-02-23 PROCEDURE — 97110 THERAPEUTIC EXERCISES: CPT

## 2021-02-23 PROCEDURE — 97140 MANUAL THERAPY 1/> REGIONS: CPT

## 2021-02-23 NOTE — PROGRESS NOTES
Ellis Wall  : 1980  Payor: [address] Rice / Plan: [address] / Product Type: PPO /  [address]  at [facility] MIGUELITO ANDREWS  [address] [facility], [address], [place] [address].  [phone]   [phone]       OUTPATIENT PHYSICAL THERAPY: Daily Treatment Note 2021  Visit Count: 3     ICD-10: Treatment Diagnosis: McGrath Dupes (M54.2), Pain in left shoulder (M25.512)  Precautions/Allergies: none/Other medication and Penicillin g   TREATMENT PLAN:  Effective Dates: 21 to 21 (60 days). Frequency/Duration: 1 to 3 times a week for 60 Day(s) MEDICAL/REFERRING DIAGNOSIS:  neck pain cervical fusion with left shoulder pain  DATE OF ONSET:   Pain: 3 years ago  Surgery: 2020  REFERRING PHYSICIAN: Judy Mcbride  MD Orders: PT- evaluate and treat, modalities, core strengthening and stretching  Return MD Appointment: unsure       Pre-treatment Symptoms/Complaints:  Pt reports the tape helped. did do some of the exercises. 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 Tender over the left pect minor   Strength:   LUE Strength  L Shoulder Flexion: 4  L Shoulder Internal Rotation: 3+(subscap)  L Shoulder External Rotation: 4(easily fatigues)                   TREATMENT:   Dry needling by Inocencio Valerio DPT to left upper trapezius spasm prior to exercises  Dry Needles Used: 1   Dry Needles Removed: 1       Modalities(  Minutes)none  Tape not re-applied   Manual treatment ( 10 minutes):  left subscap and scalene release  Therapeutic Exercise: (30 Minutes):  left median nerve glides with stabilization of the left 1st and 2nd ribs.  Practiced left shoulder IR belly press with shoulder depression to facilitate subscapularis. Exercises per grid with moderate cueing for technique. 10/9/20 10/13/20 2/16/21 2/23/21    Activity/Exercise        LTR        B IR  Left manual resisted belly press and table press with facilitation of subscap  Left belly press with shoulder depression    B ER   Red tband 2x10 Manual resisted concentric and eccentric at neutral, scaption and 90/90 with emphasis on upper trap relaxation 2x5 ea position    Chin tuck Supine 5 sec hold x5 once head positioned Supine 3x10 sec hold with facilitation of left paraspinal muscle Supine without pillow after manual treatment     diagonal             Treatment/Session Summary:    · Response to Treatment: good return demonstration of exercise. Pt needs to perform strengthening for rotator cuff, subscap along with nerve glides  · Communication/Consultation:  None today  · Equipment provided today:  None today  · Recommendations/Intent for next treatment session: Next visit will focus on manual treatment, HEP modification and upgrade. Address subscap.  Assess mechanical traction  Total Treatment Billable Duration: 40   minutes   PT Patient Time In/Time Out  Time In: 1035  Time Out: [address] PT    Future Appointments   Date Time Provider Jayda Carballo   2/26/2021  8:30 AM Rishi Hole, PT [facility]   3/1/2021  9:00 AM Rishi Hole, PT [facility]   3/3/2021  9:00 AM Rishi Hole, PT [facility]   3/8/2021  9:00 AM Rishi Hole, PT [facility]   3/10/2021  9:00 AM Rishi Hole, PT [facility]   3/17/2021  9:00 AM Rishi Hole, PT [facility]   3/22/2021  9:00 AM Rishi Hole, PT [facility]   3/24/2021  9:00 AM Rishi Hole, PT [facility]   3/29/2021  9:45 AM Rishi Hole, PT [facility]   3/31/2021  9:45 AM Rishi Hole, PT [facility]   4/12/2021  8:45 AM Kell Rinaldi MD [facility]

## 2021-02-26 ENCOUNTER — HOSPITAL ENCOUNTER (OUTPATIENT)
Dept: PHYSICAL THERAPY | Age: 41
Discharge: HOME | End: 2021-02-26
Payer: COMMERCIAL

## 2021-02-26 PROCEDURE — 97110 THERAPEUTIC EXERCISES: CPT

## 2021-02-26 PROCEDURE — 97140 MANUAL THERAPY 1/> REGIONS: CPT

## 2021-02-26 NOTE — PROGRESS NOTES
Jerome Bland  : 1980  Payor: Greyson Rice / Plan: [address] / Product Type: PPO /  [address] at [facility] MIGUELITO ANDREWS  [address] [facility], [address], [place] [address].  [phone]   [phone]       OUTPATIENT PHYSICAL THERAPY: Daily Treatment Note 2021  Visit Count: 4     ICD-10: Treatment Diagnosis: Spero Lockeford (M54.2), Pain in left shoulder (M25.512)  Precautions/Allergies: none/Other medication and Penicillin g   TREATMENT PLAN:  Effective Dates: 21 to 21 (60 days). Frequency/Duration: 1 to 3 times a week for 60 Day(s) MEDICAL/REFERRING DIAGNOSIS:  neck pain cervical fusion with left shoulder pain  DATE OF ONSET:   Pain: 3 years ago  Surgery: 2020  REFERRING PHYSICIAN: Kaylene Saleh*  MD Orders: PT- evaluate and treat, modalities, core strengthening and stretching  Return MD Appointment: unsure       Pre-treatment Symptoms/Complaints:  Pt reports did do the exercises, nerve glides are still a challenge. Have been fixing hair. Had a sudden severe headache yesterday but it went away  Pain: Initial:    1-2/ 10 right,   5-7 /10 left Post Session: no complaints of pain   Medications Last Reviewed:  21    Updated Objective Findings:     Observation: overuse of left upper trap with reaching  Palpation: Tender over the left subscapularis and infraspinatus  Strength:   LUE Strength  L Shoulder Horizontal ABduction: 4  L Shoulder Internal Rotation: 3+(quickly fatigues)  L Shoulder External Rotation: 4-(neutral, in scaption and 90/90)    RUE Strength  R Shoulder Horizontal ABduction: 4  R Shoulder Internal Rotation: 5  R Shoulder External Rotation: 4+              TREATMENT:     Modalities(  Minutes)none  Tape not re-applied   Manual treatment ( 10 minutes): left subscap release; Left median nerve glide  Therapeutic Exercise: (29 Minutes):left subscap release AIS and pt hold ate end motion IR.  Practiced left shoulder IR belly press with shoulder depression to facilitate subscapularis. Instructed and issued ER in scaption and 90/90 seated with elbow supported to prevent use of upper trap, prone B midtrap with emphasis on scapular retraction/depression. Max to moderate cueing for technique. 10/9/20 10/13/20 2/16/21 2/23/21    Activity/Exercise        LTR        B IR  Left manual resisted belly press and table press with facilitation of subscap  Left belly press with shoulder depression    B ER   Red tband 2x10 Manual resisted concentric and eccentric at neutral, scaption and 90/90 with emphasis on upper trap relaxation 2x5 ea position    Chin tuck Supine 5 sec hold x5 once head positioned Supine 3x10 sec hold with facilitation of left paraspinal muscle Supine without pillow after manual treatment     diagonal             Treatment/Session Summary:    · Response to Treatment: good return demonstration of exercise for strengthening of rotator cuff, subscap and midtrap. Advised pt to contact the doctor if she continues with headaches and HEP using 1# becomes more difficult. Pt agrees  · Communication/Consultation:  None today  · Equipment provided today:  None today  · Recommendations/Intent for next treatment session: Next visit will focus on manual treatment, HEP modification and upgrade.  Assess mechanical traction if symptoms have improved  Total Treatment Billable Duration: 39   minutes   PT Patient Time In/Time Out  Time In: 0833  Time Out: 1 Jarrod Porter PT    Future Appointments   Date Time Provider Jayda Carballo   3/1/2021  9:00 AM Burris Lemme, PT [facility]   3/3/2021  9:00 AM Burris Lemme, PT [facility]   3/8/2021  9:00 AM Burris Lemme, PT [facility]   3/10/2021  9:00 AM Burris Lemme, PT [facility]   3/17/2021  9:00 AM Burris Lemme, PT [facility]   3/22/2021  9:00 AM Burris Lemme, PT [facility]   3/24/2021  9:00 AM Migdalia Miranda JULIAN Varma [facility]   3/29/2021  9:45 AM [facility]   3/31/2021  9:45 AM [facility]   4/12/2021  8:45 AM Augustin Taylor MD [facility]

## 2021-03-01 ENCOUNTER — HOSPITAL ENCOUNTER (OUTPATIENT)
Dept: PHYSICAL THERAPY | Age: 41
Discharge: HOME | End: 2021-03-01
Payer: COMMERCIAL

## 2021-03-01 PROCEDURE — 97110 THERAPEUTIC EXERCISES: CPT

## 2021-03-01 PROCEDURE — 97140 MANUAL THERAPY 1/> REGIONS: CPT

## 2021-03-01 PROCEDURE — 97012 MECHANICAL TRACTION THERAPY: CPT

## 2021-03-01 NOTE — PROGRESS NOTES
Jesus Blue  : 1980  Payor: Greyson Rice / Plan: [address] / Product Type: PPO /  [address] at [facility] MIGUELITO ANDREWS  [address] [facility], [address], [place] [address].  [phone]   [phone]       OUTPATIENT PHYSICAL THERAPY: Daily Treatment Note 3/1/2021  Visit Count: 5     ICD-10: Treatment Diagnosis: Cervalgia (M54.2), Pain in left shoulder (M25.512)  Precautions/Allergies: none/Other medication and Penicillin g   TREATMENT PLAN:  Effective Dates: 21 to 21 (60 days). Frequency/Duration: 1 to 3 times a week for 60 Day(s) MEDICAL/REFERRING DIAGNOSIS:  neck pain cervical fusion with left shoulder pain  DATE OF ONSET:   Pain: 3 years ago  Surgery: 2020  REFERRING PHYSICIAN: Di Hinds Sender*  MD Orders: PT- evaluate and treat, modalities, core strengthening and stretching  Return MD Appointment: unsure       Pre-treatment Symptoms/Complaints:  Pt reports did the exercises. Used ice and heat. Still having headaches that hit out of nowhere going up the back of her neck to behind the ear  Pain: Initial:   0-1/ 10 right,    6/10 left,    Post Session: left shoulder went to sleep briefly when on traction but is fine once sitting,    Medications Last Reviewed:  3/1/21 advil    Updated Objective Findings:    Observation: good posture  Palpation: Tender over the left subscap with weak contraction. Weak left Rotator cuff/ER  Strength:   LUE Strength  L Shoulder Internal Rotation: 3-(subscap)  L Shoulder External Rotation: 3+(4- after)                   TREATMENT:     Modalities( 15 Minutes) intermittent cervical traction at 10# 30 sec pull/10 sec 5# rest for soft tissue release of the lower cervical region  Tape re-applied to inhibit upper trap   Manual treatment ( 15 minutes): left subscap release; Left median nerve glide with stabilization of the 1st then 2nd rib  Therapeutic Exercise: (10 Minutes):left subscap hold-relax through shoulder ER range.  Left shoulder IR belly press with shoulder depression to facilitate subscapularis. Supine static chin depression hold with pt holding chin tuck into treatment table 5x3 sec holds to facilitate rotator cuff on left    2/16/21 2/23/21 3/1/21     Activity/Exercise        LTR        B IR  Left belly press with shoulder depression Belly press     B ER Red tband 2x10 Manual resisted concentric and eccentric at neutral, scaption and 90/90 with emphasis on upper trap relaxation 2x5 ea position Manual static holds through range     Chin tuck Supine without pillow after manual treatment  Supine without pillow                  Treatment/Session Summary:    · Response to Treatment: left shoulder numbness could be from positioning of the arm while supine on traction. Symptoms resolved once sitting. Good return demonstration of exercise for facilitation of the rotator cuff and subscap but both fatigued easily. Reminded pt to contact the doctor if she continues with headaches and weakness. Pt to remove tape if skin becomes irritated. Pt agreed  · Communication/Consultation:  None today  · Equipment provided today:  None today  · Recommendations/Intent for next treatment session: Next visit will focus on manual treatment, HEP modification and upgrade.  Assess mechanical traction benefits  Total Treatment Billable Duration: 40   minutes   PT Patient Time In/Time Out  Time In: 0900  Time Out: [address], PT    Future Appointments   Date Time Provider Jayda Carballo   3/3/2021  9:00 AM Shaun Lawson, PT [facility]   3/8/2021  9:00 AM Shaun Lawson, PT [facility]   3/10/2021  9:00 AM Shaun Lawson, PT [facility]   3/17/2021  9:00 AM Shaun Renny, PT [facility]   3/22/2021  9:00 AM Shaun Lawson, PT [facility]   3/24/2021  9:00 AM Shaun Lawson, PT [facility]   3/29/2021  9:45 AM Shaun Renny, PT [facility] 3/31/2021  9:45 AM Anton Youssef PT [facility]   4/12/2021  8:45 AM Zenaida Prajapati MD [facility]

## 2021-03-03 ENCOUNTER — HOSPITAL ENCOUNTER (OUTPATIENT)
Dept: PHYSICAL THERAPY | Age: 41
Discharge: HOME | End: 2021-03-03
Payer: COMMERCIAL

## 2021-03-03 PROCEDURE — 97110 THERAPEUTIC EXERCISES: CPT

## 2021-03-03 PROCEDURE — 97140 MANUAL THERAPY 1/> REGIONS: CPT

## 2021-03-03 NOTE — PROGRESS NOTES
Lyubov Elena  : 1980  Payor: [address] / Plan: [address] / Product Type: PPO /  [address]  at [address] [place] [address] Rd  [address] [place] [facility], [address], [place] [address].  [phone]   [phone]       OUTPATIENT PHYSICAL THERAPY: Daily Treatment Note 3/3/2021  Visit Count: 6     ICD-10: Treatment Diagnosis: Fatoumata Castillo (M54.2), Pain in left shoulder (M25.512)  Precautions/Allergies: none/Other medication and Penicillin g   TREATMENT PLAN:  Effective Dates: 21 to 21 (60 days). Frequency/Duration: 1 to 3 times a week for 60 Day(s) MEDICAL/REFERRING DIAGNOSIS:  neck pain cervical fusion with left shoulder pain  DATE OF ONSET:   Pain: 3 years ago  Surgery: 2020  REFERRING PHYSICIAN: Miller Guzman*  MD Orders: PT- evaluate and treat, modalities, core strengthening and stretching  Return MD Appointment: unsure       Pre-treatment Symptoms/Complaints:  Pt reports pain in the upper left arm (pointing to the biceps tendon). Did the exercises. Was feeling good until working on two heads for two hours. Having headaches yesterday too. dont do traction today due to soreness after last treatment  Pain: Initial:     1 / 10 right,     \"bad\"  /10 left    Post Session: no number given, tired    Medications Last Reviewed:  3/1/21 advil    Updated Objective Findings:   Observation:   Palpation: Tender over the left subscap with weak contraction. Tender over the left biceps tendon and proximal muscle. Weak left Rotator cuff/ER  Strength: n/t                      TREATMENT:     Modalities(  Minutes) none  Tape re-applied to inhibit upper trap   Manual treatment ( 10 minutes): left subscap release; Left median nerve glide with stabilization of the 1st rib. Cross friction release of the left biceps tendon.   Kinesio taping for decompression of the biceps tendon and inhibition of the left upper trap  Therapeutic Exercise: (30 Minutes):left subscap hold-relax through shoulder ER range. Left shoulder IR belly press with shoulder depression to facilitate subscapularis. Supine static chin depression hold with pt holding resisted chin tuck into treatment table 5x3 sec holds to facilitate rotator cuff on left. Side L midtrap lifts 2x10   2/16/21 2/23/21 3/1/21 3/3/21    Activity/Exercise        LTR        B IR  Left belly press with shoulder depression Belly press Belly press with tapping of the subscap    B ER Red tband 2x10 Manual resisted concentric and eccentric at neutral, scaption and 90/90 with emphasis on upper trap relaxation 2x5 ea position Manual static holds through range Manual static holds followed   By resisted ecc from neutral to 90/90 position    Chin tuck Supine without pillow after manual treatment  Supine without pillow Supine without pillow                 Treatment/Session Summary:    · Response to Treatment: weak rotator cuff,midtrap and subscap but both fatigued easily. Pt to remove tape if skin becomes irritated. Pt agreed  · Communication/Consultation:  None today  · Equipment provided today:  None today  · Recommendations/Intent for next treatment session: Next visit will focus on manual treatment, HEP modification and upgrade.    Total Treatment Billable Duration: 40   minutes   PT Patient Time In/Time Out  Time In: 3181  Time Out: Nila [address], PT    Future Appointments   Date Time Provider Jayda Carballo   3/8/2021  9:00 AM Frederich Flirt, PT [facility]   3/10/2021  9:00 AM Frederich Flirt, PT [facility]   3/17/2021  9:00 AM Frederich Flirt, PT [facility]   3/22/2021  9:00 AM Frederich Flirt, PT [facility]   3/24/2021  9:00 AM Frederich Flirt, PT [facility]   3/29/2021  9:45 AM Frederich Flirt, PT [facility]   3/31/2021  9:45 AM Frederich Flirt, PT [facility]   4/12/2021  8:45 AM MD ADELAIDE Stern

## 2021-03-08 ENCOUNTER — HOSPITAL ENCOUNTER (OUTPATIENT)
Dept: PHYSICAL THERAPY | Age: 41
Discharge: HOME | End: 2021-03-08
Payer: COMMERCIAL

## 2021-03-08 PROCEDURE — 97110 THERAPEUTIC EXERCISES: CPT

## 2021-03-08 PROCEDURE — 97012 MECHANICAL TRACTION THERAPY: CPT

## 2021-03-08 NOTE — PROGRESS NOTES
Gladis Batch  : 1980  Payor: [id number] / Plan: [address] / Product Type: PPO /  [address] at [facility] MIGUELITO ANDREWS  [address] [facility], [address], [address] [facility]  [phone]   [phone]       OUTPATIENT PHYSICAL THERAPY: Daily Treatment Note 3/8/2021  Visit Count: 7     ICD-10: Treatment Diagnosis: Clenton Santa (M54.2), Pain in left shoulder (M25.512)  Precautions/Allergies: none/Other medication and Penicillin g   TREATMENT PLAN:  Effective Dates: 21 to 21 (60 days). Frequency/Duration: 1 to 3 times a week for 60 Day(s) MEDICAL/REFERRING DIAGNOSIS:  neck pain cervical fusion with left shoulder pain  DATE OF ONSET:   Pain: 3 years ago  Surgery: 2020  REFERRING PHYSICIAN: Gerhard Wasserman*  MD Orders: PT- evaluate and treat, modalities, core strengthening and stretching  Return MD Appointment: unsure       Pre-treatment Symptoms/Complaints:  Pt reports pain in the upper trap muscle on the left, right is fine. Did the exercises. no headaches. Lets try traction  Pain: Initial:     0-1 / 10 right,    6-7 /10 left    Post Session: it feels ok   Medications Last Reviewed:  3/8/21 advil    Updated Objective Findings:   Observation:good posture. Hypertrophy of the left upper trap  Palpation: less left median nerve tension  Strength: n/t                      TREATMENT:     Modalities( 10 Minutes) intermittent mechanical cervical traction with 10# 30 sec pull/5# 10 sec rest for soft tissue release in the lower cervical region. Tape re-applied to inhibit upper trap   Manual treatment ( 4 minutes): left median nerve glides with stabilization of the first rib. Kinesio taping for decompression of the biceps tendon and inhibition of the left upper trap  Therapeutic Exercise: (29 Minutes):left subscap hold-relax through shoulder ER range. Left shoulder IR lift off with shoulder depression to facilitate subscapularis.  Supine static chin depression hold with pt holding resisted chin tuck into treatment table 6x3 sec holds to facilitate rotator cuff on left. Side L midtrap lifts 1# 2x10   2/16/21 2/23/21 3/1/21 3/3/21 3/8/21   Activity/Exercise        LTR        B IR  Left belly press with shoulder depression Belly press Belly press with tapping of the subscap Seated lift off,  Supine manual resisted   B ER Red tband 2x10 Manual resisted concentric and eccentric at neutral, scaption and 90/90 with emphasis on upper trap relaxation 2x5 ea position Manual static holds through range Manual static holds followed   By resisted ecc from neutral to 90/90 position Side L 1# 2x10,  Supine L resisted ecc 2x5   Chin tuck Supine without pillow after manual treatment  Supine without pillow Supine without pillow Supine without pillow                Treatment/Session Summary:    · Response to Treatment: some improvement in rotator cuff and midtrap endurance. Pt reported less pain after treatment  · Communication/Consultation:  None today  · Equipment provided today:  None today  · Recommendations/Intent for next treatment session: Next visit will focus on manual treatment, HEP modification and upgrade.    Total Treatment Billable Duration: 43   minutes   PT Patient Time In/Time Out  Time In: 0900  Time Out: Nila [address], PT    Future Appointments   Date Time Provider Jayda Carballo   3/10/2021  9:00 AM Zefeleciaee Jose Manuelw, [place] [facility]   3/17/2021  9:00 AM Zebedee Hew, PT [facility]   3/22/2021  9:00 AM Zebedee Hew, PT [facility]   3/24/2021  9:00 AM Zebedee Hew, PT [facility]   3/29/2021  9:45 AM Zebedee Hew, PT [facility]   3/31/2021  9:45 AM Zebedrandee Hew, PT [facility]   4/12/2021  8:45 AM Juan Luis Sutherland MD [facility]

## 2021-03-10 ENCOUNTER — HOSPITAL ENCOUNTER (OUTPATIENT)
Dept: PHYSICAL THERAPY | Age: 41
Discharge: HOME | End: 2021-03-10
Payer: COMMERCIAL

## 2021-03-10 PROCEDURE — 97140 MANUAL THERAPY 1/> REGIONS: CPT

## 2021-03-10 NOTE — PROGRESS NOTES
Tonio Snyder  : 1980  Primary: [facility]  Secondary:  Therapy Center at [address] [place] [address] Rd  1101 [facility],  Nila Draper, [address] [place] [address]  [phone]   [phone]      Outpatient PHYSICAL THERAPY: PHYSICIAN COMMUNICATION    REFERRING PHYSICIAN: Ana MONROY    MEDICAL/REFERRING DIAGNOSIS:  · neck pain  ATTENDANCE: Tonio Snyder has attended all scheduled 8 appointments     ASSESSMENT:  DATE: 3/10/2021    PROGRESS: Tonio Snyder has received therapy to address neck pain; upper limb nerve tension for median and ulnar nerves; left first and second rib restrictions; left Pect minor, left subscap, left upper trap trigger points; left biceps tendonitis and left rotator cuff with subscapularis weakness. She has been compliant with recommendations and home program consisting of nerve glides, strengthening and posture correction. The right arm is now symptom free, neck pain is under control with good ROM and upper limb nerve mobility. She continues to have left rotator cuff weakness that is not responding to strengthening exercise. She is testing positive for left rotator cuff pathology. RECOMMENDATIONS: hold home program rotator cuff strengthening beyond ROM and posture correction. Pt to see referring doctor for assessment.  Possible orthopedic assessment of left shoulder    Thank you for this referral,  Jh Larry, PT

## 2021-03-10 NOTE — PROGRESS NOTES
Karl Cheng  : 1980  Payor: [address] / Plan: [address] / Product Type: PPO /  [address] [place]  at [facility] MIGUELITO ANDREWS  1101 [facility], [address], [place] [address].  [phone]   [phone]       OUTPATIENT PHYSICAL THERAPY: Daily Treatment Note 3/10/2021  Visit Count: 8     ICD-10: Treatment Diagnosis: Lanice Parcel (M54.2), Pain in left shoulder (M25.512)  Precautions/Allergies: none/Other medication and Penicillin g   TREATMENT PLAN:  Effective Dates: 21 to 21 (60 days). Frequency/Duration: 1 to 3 times a week for 60 Day(s) MEDICAL/REFERRING DIAGNOSIS:  neck pain cervical fusion with left shoulder pain  DATE OF ONSET:   Pain: 3 years ago  Surgery: 2020  REFERRING PHYSICIAN: Connor Potter*  MD Orders: PT- evaluate and treat, modalities, core strengthening and stretching  Return MD Appointment: unsure       Pre-treatment Symptoms/Complaints:  Pt reports she responded better to the traction. Was sore in the muscles but not sure if it was also due to sitting in an uncomfortable sofa right after therapy. Still having headaches but not as bad. Pain: Initial:      0/ 10 right,    7 /10 left    Post Session: no change   Medications Last Reviewed:  3/8/21 advil    Updated Objective Findings:   Observation: good posture  Palpation: tender over the biceps tendon, infraspinatus muscle, subacromial region, subscapularis  Strength:   LUE Strength  L Shoulder Internal Rotation: 3  L Shoulder External Rotation: 4-                   TREATMENT:     Modalities(  Minutes) none  Tape re-applied to inhibit upper trap, biceps tendon space correction and infraspinatus facilitation.    Manual treatment ( 39 minutes): left median nerve glides with stabilization of the first and second ribs, upper cervical flexion stretch, manual cervical tracion, left 1st rib mobilizations, left subscap and pect minor relesase  Therapeutic Exercise: ( ):   none   3/1/21 3/3/21 3/8/21     Activity/Exercise        LTR        B IR Belly press Belly press with tapping of the subscap Seated lift off,  Supine manual resisted     B ER Manual static holds through range Manual static holds followed   By resisted ecc from neutral to 90/90 position Side L 1# 2x10,  Supine L resisted ecc 2x5     Chin tuck Supine without pillow Supine without pillow Supine without pillow                  Treatment/Session Summary:    · Response to Treatment: still weakness in L rotator cuff and midtrap. Much better nerve mobility. Improvement in headaches. · Communication/Consultation:  None today  · Equipment provided today:  None today  · Recommendations/Intent for next treatment session: Next visit will focus on manual treatment, HEP modification and upgrade.  Recommend she contact the dr about her headaches, continued weakness and pain  Total Treatment Billable Duration: 39   minutes   PT Patient Time In/Time Out  Time In: 0900  Time Out: [address] [facility],     Future Appointments   Date Time Provider Jayda Carballo   3/17/2021  9:00 AM Zoey Garcia PT [facility]   3/22/2021  9:00 AM Zoey Garcia PT [facility]   3/24/2021  9:00 AM Zoey Garcia PT [facility]   3/29/2021  9:45 AM Zoey Garcia, PT [facility]   3/31/2021  9:45 AM Zoey Garcia PT [facility]   4/12/2021  8:45 AM Marci Gonzalez MD [facility]

## 2021-03-17 ENCOUNTER — HOSPITAL ENCOUNTER (OUTPATIENT)
Dept: PHYSICAL THERAPY | Age: 41
Discharge: HOME | End: 2021-03-17
Payer: COMMERCIAL

## 2021-03-22 ENCOUNTER — HOSPITAL ENCOUNTER (OUTPATIENT)
Dept: PHYSICAL THERAPY | Age: 41
Discharge: HOME | End: 2021-03-22
Payer: COMMERCIAL

## 2021-03-22 PROCEDURE — 97140 MANUAL THERAPY 1/> REGIONS: CPT

## 2021-03-22 PROCEDURE — 97110 THERAPEUTIC EXERCISES: CPT

## 2021-03-22 NOTE — PROGRESS NOTES
Lyubov Elena  : 1980  Payor: [address] Rice / Plan: [address] / Product Type: PPO /  [address] [place]  at [facility] MIGUELITO ANDREWS  1101 [facility], [address], [place] [address].  [phone]   [phone]       OUTPATIENT PHYSICAL THERAPY: Daily Treatment Note 3/22/2021  Visit Count: 9     ICD-10: Treatment Diagnosis: Fatoumata Castillo (M54.2), Pain in left shoulder (M25.512)  Precautions/Allergies: none/Other medication and Penicillin g   TREATMENT PLAN:  Effective Dates: 21 to 21 (60 days). Frequency/Duration: 1 to 3 times a week for 60 Day(s) MEDICAL/REFERRING DIAGNOSIS:  neck pain cervical fusion with left shoulder pain  DATE OF ONSET:   Pain: 3 years ago  Surgery: 2020  REFERRING PHYSICIAN: Siria Guzman*  MD Orders: PT- evaluate and treat, modalities, core strengthening and stretching  Return MD Appointment: 4/15/21       Pre-treatment Symptoms/Complaints:  Pt reports she didn't do the exercises due to her  being in the hospital. Still having headaches that come and go.    Pain: Initial:      0-1/ 10 right,    6 /10 left    Post Session: feel sore   Medications Last Reviewed:  3/8/21 arron and tumeric juice    Updated Objective Findings:   Observation: good posture  Palpation: tender over the left biceps tendon/ groove, infraspinatus muscle, supraspinatus, subscapularis  Strength:   LUE Strength  L Shoulder Internal Rotation: 3+(subscap)  L Shoulder External Rotation: 4(4- when fatigued)                   TREATMENT:     Modalities(  Minutes) none  Tape not re-applied   Manual treatment ( 25 minutes): left median nerve glides with stabilization of the first and second ribs, upper cervical flexion stretch, manual cervical tracion, left C6-T1 mobilizations, left subscap release, right scalene, suboccipital release  Therapeutic Exercise: (15 Minutes): for muscle facilitation strength    3/1/21 3/3/21 3/8/21 3/22/21    Activity/Exercise        LTR        B IR Belly press Belly press with tapping of the subscap Seated lift off,  Supine manual resisted Seated B lift off,  Supine manual resist    B ER Manual static holds through range Manual static holds followed   By resisted ecc from neutral to 90/90 position Side L 1# 2x10,  Supine L resisted ecc 2x5 Supine manual resist and resisted eccentrics    Chin tuck Supine without pillow Supine without pillow Supine without pillow     midtrap    Supine manual resist         Treatment/Session Summary:    · Response to Treatment:  L rotator cuff, subscap and midtrap quickly fatigue but stronger. · Communication/Consultation:  None today  · Equipment provided today:  None today  · Recommendations/Intent for next treatment session: Next visit will focus on manual treatment, HEP modification and upgrade.  tape  Total Treatment Billable Duration: 40   minutes   PT Patient Time In/Time Out  Time In: 0900  Time Out: [address], PT    Future Appointments   Date Time Provider Jayda Carballo   3/24/2021  9:00 AM Magdalen Mazin, [place] [facility]   3/29/2021  9:45 AM Magdalen Grams, PT [facility]   3/31/2021  9:45 AM Magdalen Grams, PT [facility]   4/15/2021 12:00 PM MD EDWARDO Nichols

## 2021-03-24 ENCOUNTER — HOSPITAL ENCOUNTER (OUTPATIENT)
Dept: PHYSICAL THERAPY | Age: 41
Discharge: HOME | End: 2021-03-24
Payer: COMMERCIAL

## 2021-03-24 PROCEDURE — 97140 MANUAL THERAPY 1/> REGIONS: CPT

## 2021-03-24 PROCEDURE — 97110 THERAPEUTIC EXERCISES: CPT

## 2021-03-24 NOTE — PROGRESS NOTES
María Elena Cabrera    : 1980  Payor: Greyson Rice / Plan: [address] / Product Type: PPO /  [address] at [facility] MIGUELITO ANDREWS  1101 [facility], [address], [facility] [facility]  [phone]   [phone]       OUTPATIENT PHYSICAL THERAPY: Daily Treatment Note 3/24/2021  Visit Count: 10     ICD-10: Treatment Diagnosis: Suzy Holt (M54.2), Pain in left shoulder (M25.512)  Precautions/Allergies: none/Other medication and Penicillin g   TREATMENT PLAN:  Effective Dates: 21 to 21 (60 days). Frequency/Duration: 1 to 3 times a week for 60 Day(s) MEDICAL/REFERRING DIAGNOSIS:  neck pain cervical fusion with left shoulder pain  DATE OF ONSET:   Pain: 3 years ago  Surgery: 2020  REFERRING PHYSICIAN: Renan MONROY MD Orders: PT- evaluate and treat, modalities, core strengthening and stretching  Return MD Appointment: 4/15/21       Pre-treatment Symptoms/Complaints:  Pt reports pain not as bad but still feel weak. Headaches less often and don't last as long .    Pain: Initial:      0/ 10 right,    6 /10 left    Post Session:  3/10 left   Medications Last Reviewed:  3/24/21 arron and tumeric juice    Updated Objective Findings:   Observation: good posture  Palpation: tender over the left biceps tendon/ groove, infraspinatus muscle, B scalene muscles  Positive L TOS  Strength:   LUE Strength  L Shoulder Flexion: 4-  L Shoulder ABduction: 4+  L Shoulder Horizontal ABduction: 4  L Shoulder Internal Rotation: 3+  L Shoulder External Rotation: 4  L Elbow Flexion: 4-  L Wrist Flexion: 4  L Wrist Extension: 5    RUE Strength  R Shoulder Flexion: 4+  R Shoulder ABduction: 4+  R Shoulder Horizontal ABduction: 4  R Shoulder Internal Rotation: 4  R Shoulder External Rotation: 4+              TREATMENT:     Modalities(  Minutes) none  Kinesio Tape re-applied for B 1st rib area space compression and left upper trap inhibition   Manual treatment ( 25 minutes): left median nerve glides with stabilization of the first and second ribs, B scalene release with lateral flexion stretch,upper cervical flexion stretch, manual cervical tracion, left C6-T1 mobilizations grade 4-, left subscap release, suboccipital release  Therapeutic Exercise: (15 Minutes): for muscle facilitation strength. Controlled ecc focus    3/1/21 3/3/21 3/8/21 3/22/21 3/24/21   Activity/Exercise        LTR        B IR Belly press Belly press with tapping of the subscap Seated lift off,  Supine manual resisted Seated B lift off,  Supine manual resist Seated lift off place and hold,  Supine manual resist and controlled ecc   B ER Manual static holds through range Manual static holds followed   By resisted ecc from neutral to 90/90 position Side L 1# 2x10,  Supine L resisted ecc 2x5 Supine manual resist and resisted eccentrics Supine manual resist followed by controlled ecc at multiple abd range    Chin tuck Supine without pillow Supine without pillow Supine without pillow  Supine with overpressure upper cervical flexion stretch         midtrap    Supine manual resist Supine  resisted        Treatment/Session Summary:    · Response to Treatment:  L UE weaker than the R. Less nerve tension. Negative TOS after  · Communication/Consultation:  None today  · Equipment provided today:  None today  · Recommendations/Intent for next treatment session: Next visit will focus on manual treatment, HEP modification and upgrade.  Assess tape  Total Treatment Billable Duration: 40   minutes   PT Patient Time In/Time Out  Time In: 0900  Time Out: [address] [place] [address]    Future Appointments   Date Time Provider Jayda Carballo   3/29/2021  9:45 AM Hang Horowitz [facility] [place]   3/31/2021  9:45 AM JULIAN Horowitz [facility] [place]   4/15/2021 12:00 PM MD EDWARDO Piedra

## 2021-03-24 NOTE — PROGRESS NOTES
Yocasta Orosco  : 1980  Primary: [facility]oy [facility]cor[facility]  Secondary:  Therapy Center at [facility] MIGUELITO ANDREWS  [address] [place] [address], [address].  [phone]   [phone]       OUTPATIENT PHYSICAL THERAPY:Progress Report 3/24/2021   ICD-10: Treatment Diagnosis: Cervalgia (M54.2), Pain in left shoulder (M25.512)  Precautions/Allergies: cervical fusion/Other medication and Penicillin g   TREATMENT PLAN:  Effective Dates: 2021 TO 2021 (60 days). Frequency/Duration: 1 to 3 times a week for 60 Day(s) MEDICAL/REFERRING DIAGNOSIS:  neck pain left shoulder pain  DATE OF ONSET:    pain:3 years ago  Surgery:   REFERRING PHYSICIAN: Anny MONROY MD Orders: PT- evaluate and treat  Return MD Appointment: 4/15/21      ASSESSMENT:  Ms. Jada Perez presents with only minimal strength gains in B upper extremities. AROM of neck improved to [facility] due to fusion. AROM BUE WNL. There is less upper limb nerve tension in the left only and only at end range median nerve. Compensatory overuse of the left upper trap is still noted with fatigue of rotator cuff and subscapularis muscle. Therapy treatment consisted of PRN ultrasound, kinesio taping, manual treatment and strengthening. She will be going to neuro surgeon to discuss possible surgery. 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 She demonstrates good compliance with HEP and recommendations. She will benefit from continued skilled therapy to address her deficits. GOALS: (Goals have been discussed and agreed upon with patient.)  Discharge Goals: Time Frame: 60 days     1. Independent with HEP- being met     2. L shoulder AROM WNL without nerve pain to allow ADL's - met except for left ER and IR     3. L shoulder Strength 5/5 to allow home care without compensation- not met     4. Decrease Neck Disability Index by at least 5 points to show improved function- not quite met     5.  ADL's with pain < 3/10- not consistently met    OUTCOME MEASURE:   Tool Used: Neck Disability Index (NDI)  Score:  Initial: 17/50  Most Recent: 21/50 (Date: 3/24/21 )   Interpretation of Score: The Neck Disability Index is a revised form of the Oswestry Low Back Pain Index and is designed to measure the activities of daily living in person's with neck pain. Each section is scored on a 0-5 scale, 5 representing the greatest disability. The scores of each section are added together for a total score of 50. MEDICAL NECESSITY:   · Skilled intervention continues to be required due to need for manual treatment before progression of exercise. REASON FOR SERVICES/OTHER COMMENTS:  · Patient continues to require skilled intervention due to need for guided progression into exercises. Total Duration:    minutes           PAIN/SUBJECTIVE:   Initial:   R 0/10, L 0-6/10, neck tired  Doing the HEP. Not seeing but a few clients on occasion at work. Therapy and taping really helps. Feel stronger but arms still get tired Post Session:  L 3/10, neck tired         EXAMINATION:   Observation/Orthostatic Postural Assessment:          Good posture.  Compensatory use of upper trap when fatigued   Palpation:          Tender over the left biceps tendon region, left subacromial region, left subscap, B scalenes  ROM:          Neck WFL, BUE WNL  Strength:   LUE Strength  L Shoulder Flexion: 4-  L Shoulder ABduction: 4+  L Shoulder Horizontal ABduction: 4  L Shoulder Internal Rotation: 3+  L Shoulder External Rotation: 4  L Elbow Flexion: 4-  L Wrist Flexion: 4  L Wrist Extension: 5    RUE Strength  R Shoulder Flexion: 4+  R Shoulder ABduction: 4+  R Shoulder Horizontal ABduction: 4  R Shoulder Internal Rotation: 4  R Shoulder External Rotation: 4+             Special tests: positive ULTT for left median nerve end range; positive TOS

## 2021-03-29 ENCOUNTER — HOSPITAL ENCOUNTER (OUTPATIENT)
Dept: PHYSICAL THERAPY | Age: 41
Discharge: HOME | End: 2021-03-29
Payer: COMMERCIAL

## 2021-03-29 PROCEDURE — 97140 MANUAL THERAPY 1/> REGIONS: CPT

## 2021-03-29 PROCEDURE — 97110 THERAPEUTIC EXERCISES: CPT

## 2021-03-29 NOTE — PROGRESS NOTES
Charles Pritchard    : 1980  Payor: Greyson Rice / Plan: [address] / Product Type: PPO /  [address] at [facility] MIGUELITO ANDREWS  1101 [facility], [address], [address] [facility]  [phone]   [phone]       OUTPATIENT PHYSICAL THERAPY: Daily Treatment Note 3/29/2021  Visit Count: 11     ICD-10: Treatment Diagnosis: Jaja Clark (M54.2), Pain in left shoulder (M25.512)  Precautions/Allergies: none/Other medication and Penicillin g   TREATMENT PLAN:  Effective Dates: 21 to 21 (60 days). Frequency/Duration: 1 to 3 times a week for 60 Day(s) MEDICAL/REFERRING DIAGNOSIS:  neck pain cervical fusion with left shoulder pain  DATE OF ONSET:   Pain: 3 years ago  Surgery: 2020  REFERRING PHYSICIAN: Yael MONROY  MD Orders: PT- evaluate and treat, modalities, core strengthening and stretching  Return MD Appointment: 4/15/21       Pre-treatment Symptoms/Complaints:  Pt reports really bad neck pain from riding to/from [place] and doing a lot around the house yesterday. Headaches came back this morning . Pain: Initial:    9/10 neck,  0/ 10 right,    6 /10 left    Post Session:  2/10 left shoulder and neck.   No headache   Medications Last Reviewed:  3/29/21    Updated Objective Findings:   Observation: slight head forward posture with left shoulder protracted  Palpation: tender over the left neck/upper trap, infraspinatus muscle, B scalene muscles   L TOS not tested  Strength:   LUE Strength  L Shoulder External Rotation: 3+                   TREATMENT:     Modalities( 9  Minutes)continuous ultrasound at 1.3 w/cm2 to neck and left upper trap region for mechanical effects prior to manual and exs  Kinesio Tape re-applied for left rotator cuff facilitation and left upper trap inhibition   Manual treatment ( 21 minutes): left median nerve glides with stabilization of the first rib, L scalene release,upper cervical flexion/lower cervical extn stretch, manual cervical tracion, grade 4-- left C6-T1 mobilizations, left subscap release, suboccipital release  Therapeutic Exercise: (10 Minutes): for muscle facilitation strength. Controlled ecc focus with active scapular depression/retraction. 3/22/21 3/24/21 3/29/21    Activity/Exercise       LTR       B IR Seated B lift off,  Supine manual resist Seated lift off place and hold,  Supine manual resist and controlled ecc Left contract-relax through range followed by controlled ecc    B ER Supine manual resist and resisted eccentrics Supine manual resist followed by controlled ecc at multiple abd range  Left manual resist followed by controlled ecc at multiple abd range    Chin tuck  Supine with overpressure upper cervical flexion stretch           midtrap Supine manual resist Supine  resisted Supine manual resisted at end range         Treatment/Session Summary:    · Response to Treatment:  L UE weaker than last visit. Pain decreased with treatment. Headache resolved  · Communication/Consultation:  None today  · Equipment provided today:  None today  · Recommendations/Intent for next treatment session: Next visit will focus on manual treatment, HEP modification and upgrade.    Total Treatment Billable Duration: 40   minutes   Time In: 9:20  PT Patient Time In/Time Out  Time Out: [address], PT    Future Appointments   Date Time Provider Jayda Carballo   3/31/2021  9:45 AM Hang Mohamud [facility]   4/15/2021 12:00 PM Janalyn Koyanagi, MD [facility]

## 2021-03-31 ENCOUNTER — HOSPITAL ENCOUNTER (OUTPATIENT)
Dept: PHYSICAL THERAPY | Age: 41
Discharge: HOME | End: 2021-03-31
Payer: COMMERCIAL

## 2021-03-31 PROCEDURE — 97140 MANUAL THERAPY 1/> REGIONS: CPT

## 2021-03-31 PROCEDURE — 97110 THERAPEUTIC EXERCISES: CPT

## 2021-03-31 NOTE — PROGRESS NOTES
Lamar Son    : 1980  Payor: Greyson Rice / Plan: [address] / Product Type: PPO /  [address] at [facility] MIGUELITO ANDREWS  1101 [facility], [address], [facility] [facility]  [phone]   [phone]       OUTPATIENT PHYSICAL THERAPY: Daily Treatment Note 3/31/2021  Visit Count: 12     ICD-10: Treatment Diagnosis: Fenwick Hence (M54.2), Pain in left shoulder (M25.512)  Precautions/Allergies: none/Other medication and Penicillin g   TREATMENT PLAN:  Effective Dates: 21 to 21 (60 days). Frequency/Duration: 1 to 3 times a week for 60 Day(s) MEDICAL/REFERRING DIAGNOSIS:  neck pain cervical fusion with left shoulder pain  DATE OF ONSET:   Pain: 3 years ago  Surgery: 2020  REFERRING PHYSICIAN: Richard MONROY MD Orders: PT- evaluate and treat, modalities, core strengthening and stretching  Return MD Appointment: 4/15/21       Pre-treatment Symptoms/Complaints:  Pt reports the tape really helped. less neck pain but left shoulder hurting from sitting 6 hours in a course yesterday. Headaches are better. Pain: Initial:    6/10 neck,  0/ 10 right,    6 /10 left    Post Session:  So much better in shoulder and neck.  tired    Medications Last Reviewed:  3/29/21    Updated Objective Findings:   Observation: good posture  Palpation: tender over the left distal upper trap, infraspinatus muscle, L scalene muscle, left bicep insertion/tendon    L TOS mild positive  Strength:   LUE Strength  L Shoulder Internal Rotation: 4-  L Shoulder External Rotation: 4(easily fatigued)                   TREATMENT:     Modalities(   Minutes)none  Kinesio Tape re-applied for left rotator cuff facilitation and left upper trap inhibition   Manual treatment ( 24 minutes): left median nerve glides with stabilization of the first rib, L scalene, splenius and upper SCM release,upper cervical flexion cervical stretch, manual cervical traction with, grade 4-- left C4-T1 mobilizations, suboccipital release  Therapeutic Exercise: (15 Minutes): for muscle facilitation strength. Controlled ecc focus with active scapular depression/retraction. 3/22/21 3/24/21 3/29/21 3/31/21   Activity/Exercise       LTR       B IR Seated B lift off,  Supine manual resist Seated lift off place and hold,  Supine manual resist and controlled ecc Left contract-relax through range followed by controlled ecc Left manual resisted concentric, ecc and belly press   B ER Supine manual resist and resisted eccentrics Supine manual resist followed by controlled ecc at multiple abd range  Left manual resist followed by controlled ecc at multiple abd range Left manual resisted at neutral to 90/90 position   Chin tuck  Supine with overpressure upper cervical flexion stretch           midtrap Supine manual resist Supine  resisted Supine manual resisted at end range Manual resisted at end range        Treatment/Session Summary:    · Response to Treatment:  L UE rotator cuff and subscap stronger than last visit. Pain decreased with treatment. No Headache   · Communication/Consultation:  None today  · Equipment provided today:  None today  · Recommendations/Intent for next treatment session: Next visit will focus on manual treatment, HEP modification and strengthening.    Total Treatment Billable Duration:   39   minutes     PT Patient Time In/Time Out  Time In: 9855  Time Out: [facility] [place], [place]    Future Appointments   Date Time Provider Jayda Carballo   4/6/2021  9:45 AM Deepak Craft PT [facility]   4/15/2021 12:00 PM MD EDWARDO Rivers

## 2021-04-06 ENCOUNTER — HOSPITAL ENCOUNTER (OUTPATIENT)
Dept: PHYSICAL THERAPY | Age: 41
Discharge: HOME | End: 2021-04-06
Payer: COMMERCIAL

## 2021-04-06 PROCEDURE — 97110 THERAPEUTIC EXERCISES: CPT

## 2021-04-06 NOTE — PROGRESS NOTES
Hayde Blankenship    : 1980  Payor: Greyson Rice / Plan: [id number] / Product Type: PPO /  [address] at [facility] MIGUELITO ANDREWS  [address] [facility], [address], [address] [place] [address]  [phone]   [phone]       OUTPATIENT PHYSICAL THERAPY: Daily Treatment Note 2021  Visit Count: 13     ICD-10: Treatment Diagnosis: Cher Deiters (M54.2), Pain in left shoulder (M25.512)  Precautions/Allergies: none/Other medication and Penicillin g   TREATMENT PLAN:  Effective Dates:   21 to 21   (30 days). Frequency/Duration: 1 to 3 times a week for 30 Day(s) MEDICAL/REFERRING DIAGNOSIS:  neck pain cervical fusion with left shoulder pain  DATE OF ONSET:   Pain: 3 years ago  Surgery: 2020  REFERRING PHYSICIAN: Liliana MONROY  MD Orders: PT- evaluate and treat, modalities, core strengthening and stretching  Return MD Appointment: 4/15/21       Pre-treatment Symptoms/Complaints:  Pt reports the tape really helps. Asked that her  be taught how to tape, stretch her neck and assist with nerve glides. Want to keep coming   Pain: Initial:    2-4/10 neck,  0/ 10 right shoulder,    5 /10 left shoulder    Post Session: no increase in pain   Medications Last Reviewed:  21    Updated Objective Findings:   Observation: slight forward head posture  Palpation: tender over the left upper trap, L subscap muscle, left bicep insertion/tendon    L TOS negative  Strength: n/t                      TREATMENT:     Modalities(   Minutes)none   instructed  on technique: Kinesio Tape re-applied for left rotator cuff facilitation and left upper trap inhibition and bicep tendon/ bicipital groove space correction   Manual treatment ( 40minutes): instructed  and pt on  left median nerve glides with stabilization of the first rib, manual cervical traction, subcap ischemic release and pect stretch.  Maximal verbal and tactile cueing as well as demonstration  Therapeutic Exercise: ( ): none   3/22/21 3/24/21 3/29/21 3/31/21   Activity/Exercise       LTR       B IR Seated B lift off,  Supine manual resist Seated lift off place and hold,  Supine manual resist and controlled ecc Left contract-relax through range followed by controlled ecc Left manual resisted concentric, ecc and belly press   B ER Supine manual resist and resisted eccentrics Supine manual resist followed by controlled ecc at multiple abd range  Left manual resist followed by controlled ecc at multiple abd range Left manual resisted at neutral to 90/90 position   Chin tuck  Supine with overpressure upper cervical flexion stretch           midtrap Supine manual resist Supine  resisted Supine manual resisted at end range Manual resisted at end range        Treatment/Session Summary:    · Response to Treatment: Pt and  demonstrated good technique after instruction. No Headache or pain with treatment   · Communication/Consultation:  None today  · Equipment provided today:  None today  · Recommendations/Intent for next treatment session: Next visit will focus on manual treatment, HEP modification for strengthening as needed.    Total Treatment Billable Duration:   40   minutes     PT Patient Time In/Time Out  Time In: 6600  Time Out: [address], PT    Future Appointments   Date Time Provider Jayda Carballo   4/13/2021 10:15 AM Karrie Ortez PT [facility]   4/15/2021 12:00 PM MD EDWARDO Velasquez

## 2021-04-13 ENCOUNTER — HOSPITAL ENCOUNTER (OUTPATIENT)
Dept: PHYSICAL THERAPY | Age: 41
Discharge: HOME | End: 2021-04-13
Payer: COMMERCIAL

## 2021-04-13 PROCEDURE — 97035 APP MDLTY 1+ULTRASOUND EA 15: CPT

## 2021-04-13 PROCEDURE — 97140 MANUAL THERAPY 1/> REGIONS: CPT

## 2021-04-13 NOTE — THERAPY RECERTIFICATION
Cherelle Ybarra : 1980 Primary: [address] Secondary:  Therapy Center at [facility] MIGUELITO ANDREWS [address] [facility], [address] [place] [address],[address], [place] [address]. [phone]   [phone] OUTPATIENT PHYSICAL THERAPY:Recertification 3/03/2674 ICD-10: Treatment Diagnosis: Cervalgia (M54.2), Pain in left shoulder (M25.512) Precautions/Allergies: cervical fusion/Other medication and Penicillin g  
TREATMENT PLAN: 
Effective Dates:   2021 to  21   (30 days). Frequency/Duration: 1 to 3 times a week for 30 Day(s) MEDICAL/REFERRING DIAGNOSIS: 
neck pain left shoulder pain DATE OF ONSET:  
 pain:3 years ago Surgery:  REFERRING PHYSICIAN: Jovan MONROY MD Orders: PT- evaluate and treat Return MD Appointment: 21 ASSESSMENT:  Ms. Shilpi Mcdowell presents with improved but continued left upper limb median nerve tension with symptoms to the left arm. She demonstrates compensatory use of the upper trap when rotator cuff is fatigued. There is slight improvement in the left subscapularis muscle strength but it and the left rotator cuff muscles remain weak compared to the right. She responds well to therapy but symptoms return between sessions. Therapy focus has been on manual treatment for cervical ROM, vertebral mobility, soft tissue and nerve tension release, strengthening of B shoulders along with PRN kinesio taping and ultrasound. Pt's  has been instructed on gentle stretching of the left arm and neck without relief. She desires to continue until deciding on surgery. She will benefit from continued skilled therapy to address her deficits, educate  on stretching for home and improve strength for more functional ADL's with less pain. GOALS: (Goals have been discussed and agreed upon with patient.) Discharge Goals: Time Frame: 60 days 1. Independent with HEP- met 2.  L shoulder AROM WNL without nerve pain to allow ADL's- met but with median nerve pain 3. L shoulder Strength 5/5 to allow home care without compensation- not met 4. Decrease Neck Disability Index by at least 5 points to show improved function-not met 5. ADL's with pain < 3/10- not consistently met OUTCOME MEASURE:  
Tool Used: Neck Disability Index (NDI) Score:  Initial: 17/50  Most Recent: 15/50 (Date: 4/13/21) Interpretation of Score: The Neck Disability Index is a revised form of the Oswestry Low Back Pain Index and is designed to measure the activities of daily living in person's with neck pain. Each section is scored on a 0-5 scale, 5 representing the greatest disability. The scores of each section are added together for a total score of 50. MEDICAL NECESSITY:  
· Skilled intervention continues to be required due to need for manual treatment before progression of exercise. REASON FOR SERVICES/OTHER COMMENTS: 
· Patient continues to require skilled intervention due to need for manual treatment prior to surgery. Total Duration:39 minutes PT Patient Time In/Time Out Time In: 4465 Time Out: 1100 Rehabilitation Potential For Stated Goals: Good Regarding Corby Andrade therapy, I certify that the treatment plan above will be carried out by a therapist or under their direction. Thank you for this referral, Aakash Groves, PT,MSPT Referring Physician Signature: Ilsa REYES* _______________________________ Date _____________ PAIN/SUBJECTIVE:  
Initial:   3-5/10 Post Session:  2-3/10 HISTORY:  
History of Injury/Illness (Reason for Referral): 
Pt reports having a gradual increase in neck and B shoulder pain over the past 3 years thinking it was from her job as a . When chiropractic adjustments didn't help she opted for surgery. She was able to perform the HEP as recommended but performing job duties made neck and nerve pain much worse. Past Medical History/Comorbidities: Ms. Miguelito Almazan  has a past medical history of cervical fusion 4/2020, Cervical myelopathy, Family history of cardiomyopathy, Impaired fasting blood sugar (7/11/2017), and Vitamin D deficiency (6/10/2016). Social History/Living Environment:  
  pt lives with  and children in a two story home. Prior Level of Function/Work/Activity: 
Works as a  working 1-3 hours with each client. Cares for the home, laundry and shopping. Personal Factors:   
          
Previous Treatment Approaches:   
      PT Nov 2020 for post-op neck fusion Ambulatory/Rehab Services H2 Model Falls Risk Assessment Risk Factors: 
     No Risk Factors Identified Ability to Rise from Chair: 
     (0)  Ability to rise in a single movement Falls Prevention Plan: No modifications necessary Total: (5 or greater = High Risk): 0  
©2010 [facility] [address] [place] [address] Patent #[id number]. Federal Law prohibits the replication, distribution or use without written permission from [facility] [employer] Current Medications:   
  
Current Outpatient Medications:  
  montelukast (SINGULAIR) 10 mg tablet, Take 1 Tab by mouth daily. , Disp: 90 Tab, Rfl: 3 
  budesonide-formoteroL (Symbicort) 160-4.5 mcg/actuation HFAA, Take 2 Puffs by inhalation two (2) times a day., Disp: 3 Inhaler, Rfl: 4 
  albuterol (PROVENTIL HFA, VENTOLIN HFA, PROAIR HFA) 90 mcg/actuation inhaler, Take 2 Puffs by inhalation every four (4) hours as needed for Wheezing., Disp: 1 Inhaler, Rfl: 3 
  acetaminophen (Tylenol Extra Strength) 500 mg tablet, Take 1,000 mg by mouth every six (6) hours as needed for Pain., Disp: , Rfl:   Ibuprofen   cetirizine (ZYRTEC) 10 mg tablet, Take 1 Tab by mouth daily. , Disp: 20 Tab, Rfl: 0 Date Last Reviewed:  4/13/21 Number of Personal Factors/Comorbidities that affect the Plan of Care: 1-2: MODERATE COMPLEXITY EXAMINATION:  
Observation/Orthostatic Postural Assessment:   
      Slight right head tilt, good posture but assumes forward head with fatigue and neck pain Palpation:   
      Tender over the left C6 region with reproduction of arm symptoms, left 1st rib, left subscap. Tight left median nerve tension ROM:   
      Neck WFL, BUE WNL Strength: LUE Strength L Shoulder Flexion: 4 L Shoulder ABduction: 4+ L Shoulder Horizontal ABduction: 4 L Shoulder Internal Rotation: 5(subscap 4, fatigues easily) L Shoulder External Rotation: 4 Special tests: positive ULTT for median nerve

## 2021-04-13 NOTE — PROGRESS NOTES
Chris Bauman    : 1980  Payor: Greyson Rice / Plan: [address] / Product Type: PPO /  [address] at [facility] MIGUELITO ANDREWS  [address] [facility], [address], [place] [address].  [phone]   [phone]       OUTPATIENT PHYSICAL THERAPY: Daily Treatment Note 2021  Visit Count: 14     ICD-10: Treatment Diagnosis: Lopez Blevins (M54.2), Pain in left shoulder (M25.512)  Precautions/Allergies: none/Other medication and Penicillin g   TREATMENT PLAN:  Effective Dates:   21 to  21  (30 days). Frequency/Duration: 1 to 3 times a week for 30 Day(s) MEDICAL/REFERRING DIAGNOSIS:  neck pain cervical fusion with left shoulder pain  DATE OF ONSET:   Pain: 3 years ago  Surgery: 2020  REFERRING PHYSICIAN: Ekta MONROY  MD Orders: PT- evaluate and treat, modalities, core strengthening and stretching  Return MD Appointment: 4/15/21       Pre-treatment Symptoms/Complaints:  Pt reports the tape helps but caused skin rash this time.  has been doing stretching but not as good as therapy. Want to keep coming   Pain: Initial:    2-3/10 neck,  0/ 10 right shoulder,    3-5 /10 left shoulder    Post Session: feels better   Medications Last Reviewed:  21    Updated Objective Findings:   Observation: slight right tilt head posture  Palpation: tender over the left 1st rib, L subscap muscle, left C6   L TOS negative  Strength:   LUE Strength  L Shoulder Flexion: 4  L Shoulder ABduction: 4+  L Shoulder Horizontal ABduction: 4  L Shoulder Internal Rotation: 5(subscap 4, fatigues easily)  L Shoulder External Rotation: 4                   TREATMENT:     Modalities( 10  Minutes)continuous ultrasound at 2.0 w/cm2 to right upper trap and cervical region for mechanical effects after manual treatment   Tape not re-applied.    Manual treatment ( 29  minutes): left median nerve glides with stabilization of the first and second ribs, manual cervical traction, L subcap ischemic release and pect stretch, grade 4-- L unilateral AP glides. Therapeutic Exercise: ( ): none   3/22/21 3/24/21 3/29/21 3/31/21   Activity/Exercise       LTR       B IR Seated B lift off,  Supine manual resist Seated lift off place and hold,  Supine manual resist and controlled ecc Left contract-relax through range followed by controlled ecc Left manual resisted concentric, ecc and belly press   B ER Supine manual resist and resisted eccentrics Supine manual resist followed by controlled ecc at multiple abd range  Left manual resist followed by controlled ecc at multiple abd range Left manual resisted at neutral to 90/90 position   Chin tuck  Supine with overpressure upper cervical flexion stretch           midtrap Supine manual resist Supine  resisted Supine manual resisted at end range Manual resisted at end range        Treatment/Session Summary:    · Response to Treatment: Pt and  demonstrated good technique after instruction. No Headache or pain with treatment   · Communication/Consultation:  None today  · Equipment provided today:  None today  · Recommendations/Intent for next treatment session: Next visit will focus on manual treatment, HEP modification for strengthening as needed.    Total Treatment Billable Duration:   39   minutes     PT Patient Time In/Time Out  Time In: 1015  Time Out: [address], PT    Future Appointments   Date Time Provider Jayda Carballo   4/15/2021 12:00 PM Anh Oliveira MD [facility]   4/20/2021  9:00 AM [facility]illes, [facility]

## 2021-04-20 ENCOUNTER — HOSPITAL ENCOUNTER (OUTPATIENT)
Dept: PHYSICAL THERAPY | Age: 41
Discharge: HOME | End: 2021-04-20
Payer: COMMERCIAL

## 2021-04-20 PROCEDURE — 97110 THERAPEUTIC EXERCISES: CPT

## 2021-04-20 PROCEDURE — 97140 MANUAL THERAPY 1/> REGIONS: CPT

## 2021-04-20 NOTE — PROGRESS NOTES
Rossy Blount    : 1980  Payor: [address] / Plan: [address] / Product Type: PPO /  [address] at [facility] MIGUELITO ANDREWS  [address] [facility], [address], [address] [facility]  [phone]   [phone]       OUTPATIENT PHYSICAL THERAPY: Daily Treatment Note 2021  Visit Count: 15     ICD-10: Treatment Diagnosis: Nathalia Jairo (M54.2), Pain in left shoulder (M25.512)  Precautions/Allergies: none/Other medication and Penicillin g   TREATMENT PLAN:  Effective Dates:   21 to  21  (30 days). Frequency/Duration: 1 to 3 times a week for 30 Day(s) MEDICAL/REFERRING DIAGNOSIS:  neck pain cervical fusion with left shoulder pain  DATE OF ONSET:   Pain: 3 years ago  Surgery: 2020  REFERRING PHYSICIAN: Rahel Tang*  MD Orders: PT- evaluate and treat, modalities, core strengthening and stretching  Return MD Appointment: schedule after seeing Dr Petey Canales. Dr Petey Canales 21       Pre-treatment Symptoms/Complaints:  Pt reports no tingling in the hands but did drop a shampoo bottle over the weekend she was trying to hold in the left hand. Doing the HEP and  has been doing stretching. Dr Lillian Tyler agrees with ruling out the left shoulder. Will get a MRI of the neck after seeing Dr Petey Canales   Pain: Initial:    5/10 neck and left shoulder,  0/ 10 right shoulder    Post Session: 3-4/10   Medications Last Reviewed:  21  Nothing except PRN advil    Updated Objective Findings:   Observation: slight right tilt head posture  Palpation: tender over the left subscap muscle, left C6. Tight left upper limb tension   L TOS- not tested  Strength: n/t                      TREATMENT:     Modalities(  Minutes)none   Manual treatment ( 10  minutes): left median nerve glides with stabilization of the first rib, manual cervical traction, grade 4-- L unilateral AP glides. Therapeutic Exercise: (31 Minutes): for muscle activation and strength.  Supine chin tuck into treatment table without pillow. Instructed pt on resetting scapular depression/retraction with upper trap relaxation and chin tuck between reps   3/22/21 3/24/21 3/29/21 3/31/21 4/20/21    Activity/Exercise         LTR         B shoulder ER     Red tband ER seated scaption and 90/90 x10 with scapular reset. Standing neutral with green tband     B IR Seated B lift off,  Supine manual resist Seated lift off place and hold,  Supine manual resist and controlled ecc Left contract-relax through range followed by controlled ecc Left manual resisted concentric, ecc and belly press L manual resist through range to belly press with controlled ecc    B ER Supine manual resist and resisted eccentrics Supine manual resist followed by controlled ecc at multiple abd range  Left manual resist followed by controlled ecc at multiple abd range Left manual resisted at neutral to 90/90 position L manual resist at neutral, scaption and 90/90 with controlled ecc    Chin tuck  Supine with overpressure upper cervical flexion stretch         Supine resisted 5 sec hold 2x5 reps,  Supine into treatment table without pillow    midtrap Supine manual resist Supine  resisted Supine manual resisted at end range Manual resisted at end range Supine isometric x10         Treatment/Session Summary:    · Response to Treatment: maintained strength for longer before becoming fatigued. Practiced B shoulder ER with green tband with good technique for HEP upgrade  · Communication/Consultation:  None today  · Equipment provided today:  None today  · Recommendations/Intent for next treatment session: Next visit will focus on manual treatment, HEP modification for strengthening as needed.    Total Treatment Billable Duration:   41   minutes     PT Patient Time In/Time Out  Time In: 0900  Time Out: [address], PT    Future Appointments   Date Time Provider Jayda Carballo   4/28/2021  9:00 AM Hang Velazco [facility]   5/17/2021  9:30 AM Gerson Maza MD [facility]

## 2021-04-28 ENCOUNTER — HOSPITAL ENCOUNTER (OUTPATIENT)
Dept: PHYSICAL THERAPY | Age: 41
Discharge: HOME | End: 2021-04-28
Payer: COMMERCIAL

## 2021-04-28 PROCEDURE — 97110 THERAPEUTIC EXERCISES: CPT

## 2021-04-28 PROCEDURE — 97140 MANUAL THERAPY 1/> REGIONS: CPT

## 2021-04-28 NOTE — PROGRESS NOTES
Chris Bauman    : 1980  Payor: Greyson Rice / Plan: [id number] [facility] / Product Type: PPO /  [address] at [facility] MIGUELITO ANDREWS  [address] [facility], [address], [address] [facility]  [phone]   [phone]       OUTPATIENT PHYSICAL THERAPY: Daily Treatment Note 2021  Visit Count: 16     ICD-10: Treatment Diagnosis: [place] Blevins (M54.2), Pain in left shoulder (M25.512)  Precautions/Allergies: none/Other medication and Penicillin g   TREATMENT PLAN:  Effective Dates:   21 to  21  (30 days). Frequency/Duration: 1 to 3 times a week for 30 Day(s) MEDICAL/REFERRING DIAGNOSIS:  neck pain cervical fusion with left shoulder pain  DATE OF ONSET:   Pain: 3 years ago  Surgery: 2020  REFERRING PHYSICIAN: Vick Estrella  MD Orders: PT- evaluate and treat, modalities, core strengthening and stretching  Return MD Appointment: schedule after seeing Dr Steph Kaur. Dr Steph Kaur 21 then MRI afterward       Pre-treatment Symptoms/Complaints:  Pt reports doing the HEP and  has been doing stretching. Trying to jog, jump rope and do lunges. Stopped jogging due to neck pain  Pain: Initial:    4/10 neck and left shoulder 5/10,  0/ 10 right shoulder    Post Session:    Feel so much better   Medications Last Reviewed:  21  Nothing except PRN advil    Updated Objective Findings:   Observation:good posture  Palpation: tender over the left subscap muscle. Tight left upper limb tension   L TOS- positive  Strength: n/t                      TREATMENT:     Modalities(  Minutes)none   Manual treatment ( 23 minutes): left median nerve glides with stabilization of the first rib, manual cervical traction, grade 4-- L unilateral AP glides, left scalene release. Therapeutic Exercise: (20 Minutes): for muscle activation and strength. Reviewed supine chin tuck into treatment table without pillow for exercises.  Instructed pt on resetting scapular depression/retraction reset during prone midtrap lift for upper trap relaxation. Exercises per grid for core and strength   3/31/21 4/20/21 4/28/21   Activity/Exercise      LTR with core and B UE in 90/90   2# B x25   B shoulder ER  Red tband ER seated scaption and 90/90 x10 with scapular reset. Standing neutral with green tband     B IR Left manual resisted concentric, ecc and belly press L manual resist through range to belly press with controlled ecc    B ER Left manual resisted at neutral to 90/90 position L manual resist at neutral, scaption and 90/90 with controlled ecc Supine B shoulder position during LTR,  Manual resist L   Chin tuck  Supine resisted 5 sec hold 2x5 reps,  Supine into treatment table without pillow    midtrap Manual resisted at end range Supine isometric x10 Reviewed prone with 1# with emphasis on scapular depression and retraction        Treatment/Session Summary:    · Response to Treatment: good technique with exercises  · Communication/Consultation:  None today  · Equipment provided today:  None today  · Recommendations/Intent for next treatment session: Next visit will focus on manual treatment, HEP modification for strengthening as needed.  Assess discharge  Total Treatment Billable Duration:   43   minutes     PT Patient Time In/Time Out  Time In: 0900  Time Out: [address] [facility], PT    Future Appointments   Date Time Provider Jayda Carballo   5/3/2021 10:30 AM Hang Cassidy [facility]   5/17/2021  9:30 AM Kevin Triana MD [facility]

## 2021-05-03 ENCOUNTER — HOSPITAL ENCOUNTER (OUTPATIENT)
Dept: PHYSICAL THERAPY | Age: 41
Discharge: HOME | End: 2021-05-03
Payer: COMMERCIAL

## 2021-05-03 PROCEDURE — 97140 MANUAL THERAPY 1/> REGIONS: CPT

## 2021-05-03 PROCEDURE — 97110 THERAPEUTIC EXERCISES: CPT

## 2021-05-03 NOTE — PROGRESS NOTES
James Howell    : 1980  Payor: [id number] / Plan: [address] / Product Type: PPO /  [address]  at [facility] MIGUELITO ANDREWS  [address] [facility], [address], [place] [address].  [phone]   [phone]       OUTPATIENT PHYSICAL THERAPY: Daily Treatment Note 5/3/2021  Visit Count: 17     ICD-10: Treatment Diagnosis: Cervalgia (M54.2), Pain in left shoulder (M25.512)  Precautions/Allergies: none/Other medication and Penicillin g    PLAN:  Discharge to HEP next visit MEDICAL/REFERRING DIAGNOSIS:  neck pain cervical fusion with left shoulder pain  DATE OF ONSET:   Pain: 3 years ago  Surgery: 2020  REFERRING PHYSICIAN: Francisco Henley*  MD Orders: PT- evaluate and treat, modalities, core strengthening and stretching  Return MD Appointment: schedule after seeing Dr Timi Boyer. Dr Timi Boyer 21 then MRI afterward       Pre-treatment Symptoms/Complaints:  Pt reports doing the HEP some. Jada Gut out of town and car ride hurt the neck. Pain: Initial:    5/10 neck and left shoulder,  0/ 10 right shoulder    Post Session:    No number given   Medications Last Reviewed:  5/3/21  Nothing except PRN advil    Updated Objective Findings:   Observation:good posture  Palpation: inhibited left subscap muscle. Tight left upper limb tension   L TOS- n/t  Strength: n/t                      TREATMENT:     Modalities(  Minutes)none   Manual treatment ( 12 minutes): left median nerve glides with stabilization of the first rib, manual cervical traction   Therapeutic Exercise: (28 Minutes): for muscle activation and strength. B midtrap press with supine chin tuck into treatment table without pillow 2x10. Exercises per grid for strength   3/31/21 4/20/21 4/28/21 5/3/21   Activity/Exercise       LTR with core and B UE in 90/90   2# B x25    B shoulder ER  Red tband ER seated scaption and 90/90 x10 with scapular reset.   Standing neutral with green tband   Left:   Manual resist at neutral, scaption, 50 deg and 80 deg abd with concentric and controlled ecc    B IR Left manual resisted concentric, ecc and belly press L manual resist through range to belly press with controlled ecc  Manual rsist with manual facilitation of subscap   B ER Left manual resisted at neutral to 90/90 position L manual resist at neutral, scaption and 90/90 with controlled ecc Supine B shoulder position during LTR,  Manual resist L    Chin tuck  Supine resisted 5 sec hold 2x5 reps,  Supine into treatment table without pillow  Supine after lower c spine extn stretch   midtrap Manual resisted at end range Supine isometric x10 Reviewed prone with 1# with emphasis on scapular depression and retraction Supine table press holding chin tuck        Treatment/Session Summary:    · Response to Treatment: good technique with exercises  · Communication/Consultation:  None today  · Equipment provided today:  None today  · Recommendations for next visit: Assess goals and HEP for discharge.  Modalities for pain control  Total Treatment Billable Duration:   40   minutes     PT Patient Time In/Time Out  Time In: 1030  Time Out: [address], PT    Future Appointments   Date Time Provider Jayda Carballo   5/17/2021  9:30 AM April Triana MD [facility]

## 2021-05-04 ENCOUNTER — HOSPITAL ENCOUNTER (OUTPATIENT)
Dept: PHYSICAL THERAPY | Age: 41
Discharge: HOME | End: 2021-05-04
Payer: COMMERCIAL

## 2021-05-04 PROCEDURE — 97035 APP MDLTY 1+ULTRASOUND EA 15: CPT

## 2021-05-04 PROCEDURE — 97140 MANUAL THERAPY 1/> REGIONS: CPT

## 2021-05-04 NOTE — THERAPY DISCHARGE
Mala Hennessy : 1980 Primary: [address] Secondary:  [facility]rapy Center at [facility] MIGUELITO ANDREWS [address] [facility], [address] [place] [address],[address], [place] [address]. [phone]   [phone] OUTPATIENT PHYSICAL THERAPY:Discharge Summary 2021 ICD-10: Treatment Diagnosis: Cervalgia (M54.2), Pain in left shoulder (M25.512) Precautions/Allergies: cervical fusion/Other medication and Penicillin g PLAN: 
Discharge to HEP and left shoulder assessment by Dr Srinath Bridges MEDICAL/REFERRING DIAGNOSIS: 
neck pain left shoulder pain DATE OF ONSET:  
 pain:3 years ago Surgery:  REFERRING PHYSICIAN: Cristiana MONROY MD Orders: PT- evaluate and treat Return MD Appointment: unsure ASSESSMENT:  Ms. Veronica Hill has made improvements in her neck ROM, nerve mobility and overall pain level. Although strength in the left arm has improved she still demonstrates compensatory use of the upper trap when rotator cuff is fatigued. The right UE strength and nerve mobility is WNL and pain free. She responded well to therapy but median nerve tension, neck pain and soreness in the left shoulder returned between sessions before she decided to quit performing job duties that aggravated the symptoms. She was compliant with all recommendations and  demonstrated good technique with gentle stretching of the left shoulder. Therapy focus was on manual treatment for cervical ROM, vertebral mobility, soft tissue release, median and ulnar nerve glides, strengthening of B shoulders along with PRN kinesio taping and ultrasound. Pt's referring doctor was in agreement that an orthopedist should assess the left shoulder for rotator cuff involment. GOALS: (Goals have been discussed and agreed upon with patient.) Discharge Goals: Time Frame: 60 days 1. Independent with HEP- met 2. L shoulder AROM WNL without nerve pain to allow ADL's- met 3.  L shoulder Strength 5/5 to allow home care without compensation- not met in subscap, rotator cuff or scapular stabilizers 4. Decrease Neck Disability Index by at least 5 points to show improved function- met 5. ADL's with pain < 3/10- not consistently met after activity OUTCOME MEASURE:  
Tool Used: Neck Disability Index (NDI) Score:  Initial: 17/50  Most Recent: 15/50 (Date: 4/13/21) 
    15/50  (Date: 5/4/21) Interpretation of Score: The Neck Disability Index is a revised form of the Oswestry Low Back Pain Index and is designed to measure the activities of daily living in person's with neck pain. Each section is scored on a 0-5 scale, 5 representing the greatest disability. The scores of each section are added together for a total score of 50. Thank you for this referral, Erendira Yadav PT,MSPT PAIN/SUBJECTIVE:  
Initial:   Right shoulder  0/10,     left shoulder  4-7 /10,        neck  4-6  /10 Post Session:     \"feels better\" HISTORY:  
   
EXAMINATION:  
Observation/Orthostatic Postural Assessment:   
      good posture and left scapular placement but assumes forward head with fatigue and neck pain Palpation:   
      Tender over the left C6 region with reproduction of arm symptoms, left 1st rib, left bicipital groove region. Left median nerve tension but significantly improved ROM:   
      Neck WFL, BUE WNL Strength: LUE Strength L Shoulder Flexion: 4 L Shoulder ABduction: 4+ L Shoulder Horizontal ABduction: 4+ L Shoulder Internal Rotation: 5(subscap 4 and fatigues easily) L Shoulder External Rotation: 4 Special tests: positive but improved ULTT for median nerve

## 2021-05-04 NOTE — PROGRESS NOTES
Daly Woods    : 1980  Payor: Greyson Rice / Plan: [address] / Product Type: PPO /  [address]  at [facility] MIGUELITO ANDREWS  [address] [facility] [place], [address] [place] [address],[address]  [phone]   [phone]       OUTPATIENT PHYSICAL THERAPY: Daily Treatment Note 2021  Visit Count: 18     ICD-10: Treatment Diagnosis: Cervalgia (M54.2), Pain in left shoulder (M25.512)  Precautions/Allergies: none/Other medication and Penicillin g    PLAN:  Discharge to [facility] next visit MEDICAL/REFERRING DIAGNOSIS:  neck pain cervical fusion with left shoulder pain  DATE OF ONSET:   Pain: 3 years ago  Surgery: 2020  REFERRING PHYSICIAN: Ludivina Baires*  MD Orders: PT- evaluate and treat, modalities, core strengthening and stretching  Return MD Appointment: schedule after seeing Dr Reyna Howell. Dr Reyna Howell 21 then MRI afterward       Pre-treatment Symptoms/Complaints:  Pt reports doing the HEP,  able to help stretch. Having some soreness on the left side of the neck. Pain: Initial:    3-4/10 neck and left shoulder,  0/ 10 right shoulder    Post Session:    No number given   Medications Last Reviewed:  5/3/21   PRN advil    Updated Objective Findings:   Observation:good posture  Palpation: tender over the left upper trap and bicipital groove.  Mild left upper limb tension   L TOS- negative  Strength:   LUE Strength  L Shoulder Flexion: 4  L Shoulder ABduction: 4+  L Shoulder Horizontal ABduction: 4+  L Shoulder Internal Rotation: 5(subscap 4 and fatigues easily)  L Shoulder External Rotation: 4                   TREATMENT:   Kinesio taping to inhibit the left upper trap and for space correction over the left biceps tendon  Modalities( 10 Minutes) continuous ultrasound at 2.0 w/cm2 to left upper trap, shoulder and levator scap regions for mechanical effects on tissue prior to manual treatment   Manual treatment ( 29 minutes): left median nerve glides with stabilization of the first rib, grade 4- left 1st rib mobilizations, trigger pint release of the distal left upper trap, cross friction massage of the left bicipital tendon,  manual cervical traction with right side bend stretch   Therapeutic Exercise:  ( 5 minutes) reviewed HEP with emphasis on core and posture correction. Give 2 days rest between strengthening ex    3/31/21 4/20/21 4/28/21 5/3/21   Activity/Exercise       LTR with core and B UE in 90/90   2# B x25    B shoulder ER  Red tband ER seated scaption and 90/90 x10 with scapular reset.   Standing neutral with green tband   Left:   Manual resist at neutral, scaption, 50 deg and 80 deg abd with concentric and controlled ecc    B IR Left manual resisted concentric, ecc and belly press L manual resist through range to belly press with controlled ecc  Manual rsist with manual facilitation of subscap   B ER Left manual resisted at neutral to 90/90 position L manual resist at neutral, scaption and 90/90 with controlled ecc Supine B shoulder position during LTR,  Manual resist L    Chin tuck  Supine resisted 5 sec hold 2x5 reps,  Supine into treatment table without pillow  Supine after lower c spine extn stretch   midtrap Manual resisted at end range Supine isometric x10 Reviewed prone with 1# with emphasis on scapular depression and retraction Supine table press holding chin tuck        Treatment/Session Summary:    · Response to Treatment: good technique with exercises and posture correction  · Communication/Consultation:  None today  · Equipment provided today:  None today  · Recommendations: discharge  Total Treatment Billable Duration:   44   minutes     PT Patient Time In/Time Out  Time In: 0900  Time Out: [id number] [facility], PT    Future Appointments   Date Time Provider Jayda Carballo   5/17/2021  9:30 AM aMricel Triana MD [facility]

## 2022-01-27 ENCOUNTER — TRANSCRIBE ORDER (OUTPATIENT)
Dept: SCHEDULING | Age: 42
End: 2022-01-27

## 2022-01-27 DIAGNOSIS — Z12.31 VISIT FOR SCREENING MAMMOGRAM: Primary | ICD-10-CM

## 2022-01-31 ENCOUNTER — TRANSCRIBE ORDER (OUTPATIENT)
Dept: SCHEDULING | Age: 42
End: 2022-01-31

## 2022-01-31 DIAGNOSIS — N64.4 BREAST PAIN: Primary | ICD-10-CM

## 2022-02-03 ENCOUNTER — TRANSCRIBE ORDER (OUTPATIENT)
Dept: SCHEDULING | Age: 42
End: 2022-02-03

## 2022-02-03 DIAGNOSIS — N64.4 BREAST PAIN: Primary | ICD-10-CM

## 2022-02-14 ENCOUNTER — HOSPITAL ENCOUNTER (OUTPATIENT)
Dept: MAMMOGRAPHY | Age: 42
Discharge: HOME | End: 2022-02-14
Attending: NURSE PRACTITIONER
Payer: COMMERCIAL

## 2022-02-14 DIAGNOSIS — N64.4 BREAST PAIN: ICD-10-CM

## 2022-02-14 PROCEDURE — 76642 ULTRASOUND BREAST LIMITED: CPT

## 2022-02-14 PROCEDURE — 77066 DX MAMMO INCL CAD BI: CPT

## 2022-03-18 PROBLEM — I82.91 CHRONIC DEEP VEIN THROMBOSIS (DVT) OF NON-EXTREMITY VEIN: Status: ACTIVE | Noted: 2020-07-27

## 2022-03-19 PROBLEM — G95.9 CERVICAL MYELOPATHY (HCC): Status: ACTIVE | Noted: 2020-04-08

## 2022-03-19 PROBLEM — R73.01 IMPAIRED FASTING BLOOD SUGAR: Status: ACTIVE | Noted: 2017-07-11

## 2022-03-19 PROBLEM — E04.1 THYROID NODULE: Status: ACTIVE | Noted: 2017-11-02

## 2022-03-20 PROBLEM — R71.8 LOW MEAN CORPUSCULAR VOLUME (MCV): Status: ACTIVE | Noted: 2020-12-29

## 2022-04-08 ENCOUNTER — HOSPITAL ENCOUNTER (OUTPATIENT)
Dept: ULTRASOUND IMAGING | Age: 42
Discharge: HOME | End: 2022-04-08
Attending: NURSE PRACTITIONER
Payer: COMMERCIAL

## 2022-04-08 VITALS
OXYGEN SATURATION: 99 % | HEART RATE: 79 BPM | WEIGHT: 177 LBS | HEIGHT: 67 IN | DIASTOLIC BLOOD PRESSURE: 68 MMHG | SYSTOLIC BLOOD PRESSURE: 143 MMHG | BODY MASS INDEX: 27.78 KG/M2 | RESPIRATION RATE: 16 BRPM | TEMPERATURE: 97.4 F

## 2022-04-08 DIAGNOSIS — E04.1 THYROID NODULE: ICD-10-CM

## 2022-04-08 PROCEDURE — 74011000250 HC RX REV CODE- 250: Performed by: RADIOLOGY

## 2022-04-08 PROCEDURE — 10005 FNA BX W/US GDN 1ST LES: CPT

## 2022-04-08 PROCEDURE — 88173 CYTOPATH EVAL FNA REPORT: CPT

## 2022-04-08 RX ORDER — LIDOCAINE HYDROCHLORIDE 20 MG/ML
20-300 INJECTION, SOLUTION INFILTRATION; PERINEURAL
Status: DISCONTINUED | OUTPATIENT
Start: 2022-04-08 | End: 2022-04-12 | Stop reason: HOSPADM

## 2022-04-08 RX ADMIN — LIDOCAINE HYDROCHLORIDE 160 MG: 20 INJECTION, SOLUTION INFILTRATION; PERINEURAL at 12:31

## 2022-04-08 NOTE — DISCHARGE INSTRUCTIONS
[address]  Department of Interventional Radiology  [facility]  [phone] Office  [phone] Fax    BIOPSY DISCHARGE INSTRUCTIONS    General Instructions:     A biopsy is the removal of a small piece of tissue for microscopic examination or testing. Healthy tissue can be obtained for the purpose of tissue-type matching for transplants. Unhealthy tissues are more commonly biopsied to diagnose disease. Lung Biopsy:     A needle lung biopsy is performed when there is a mass discovered in the lung area. The most serious risk is infection, bleeding, and pneumothorax (a collapsed lung). Signs of pneumothorax include shortness of breath, rapid heart rate, and blueness of the skin. If any of these occur, call 911. Liver Biopsy: This test helps detect cancer, infections, and the cause of an enlargement of the liver or elevated liver enzymes. It also helps to diagnose a number of liver diseases. The pain associated with the procedure may be felt in the shoulder. The risks include internal bleeding, pneumothorax, and injury to the surrounding organs. Renal Biopsy: This procedure is sometimes done to evaluate a transplanted kidney. It is also used to evaluate unexplained decrease in kidney function, blood, or protein in the urine. You may see bright red blood in the urine the first 24 hours after the test. If the bleeding lasts longer, you need to call your doctor. There is a risk of infection and bleeding into the muscle, which may cause soreness. Spinal Biopsy: This test is sometimes done in conjunction with other procedures. Your back will be sore, as we are taking a small sample of bone, which is slightly more difficult to sample than tissue. General Biopsy:     A mass can grow in any area of the body, and we are taking a specimen as ordered by your doctor. The risks are the same.  They include bleeding, pain, and infection. Home Care Instructions: You may resume your regular diet and medication regimen. Do not drink alcohol, drive, or make any important legal decisions in the next 24 hours. Do not lift anything heavier than a gallon of milk until the soreness goes away. You may use over the counter acetaminophen or ibuprofen for the soreness. You may apply an ice pack to the affected area for 20-30 minutes at time for the first 24 hours. After that, you may apply a heat pack. Call If: You should call your Physician and/or the Radiology Nurse if you have any questions or concerns about the biopsy site. Call if you should have increased pain, fever, redness, drainage, or bleeding more than a small spot on the bandage. Follow-Up Instructions: Please see your ordering doctor as he/she has requested. To Reach Us: If you have any questions about your procedure, please call the Interventional Radiology department at [phone]. After business hours (5pm) and weekends, call the answering service at [phone] and ask for the Radiologist on call to be paged. Si tiene Preguntas acerca del procedimiento, por favor llame al departamento de Radiología Intervencional al [phone]. Después de horas de oficina (5 pm) y los fines de [place], llamar al [place] Ambrosio al [phone] y pregunte por el Radiologo de [facility]. Interventional Radiology General Nurse Discharge    After general anesthesia or intravenous sedation, for 24 hours or while taking prescription Narcotics:  · Limit your activities  · Do not drive and operate hazardous machinery  · Do not make important personal or business decisions  · Do  not drink alcoholic beverages  · If you have not urinated within 8 hours after discharge, please contact your surgeon on call. * Please give a list of your current medications to your Primary Care Provider.   * Please update this list whenever your medications are discontinued, doses are changed, or new medications (including over-the-counter products) are added. * Please carry medication information at all times in case of emergency situations. These are general instructions for a healthy lifestyle:    No smoking/ No tobacco products/ Avoid exposure to second hand smoke  Surgeon General's Warning:  Quitting smoking now greatly reduces serious risk to your health. Obesity, smoking, and sedentary lifestyle greatly increases your risk for illness  A healthy diet, regular physical exercise & weight monitoring are important for maintaining a healthy lifestyle    You may be retaining fluid if you have a history of heart failure or if you experience any of the following symptoms:  Weight gain of 3 pounds or more overnight or 5 pounds in a week, increased swelling in our hands or feet or shortness of breath while lying flat in bed. Please call your doctor as soon as you notice any of these symptoms; do not wait until your next office visit. Recognize signs and symptoms of STROKE:  F-face looks uneven    A-arms unable to move or move unevenly    S-speech slurred or non-existent    T-time-call 911 as soon as signs and symptoms begin-DO NOT go       Back to bed or wait to see if you get better-TIME IS BRAIN.

## 2022-04-11 LAB — SPECIMEN SOURCE: NORMAL

## 2023-05-12 ENCOUNTER — TRANSCRIBE ORDERS (OUTPATIENT)
Dept: SCHEDULING | Age: 43
End: 2023-05-12

## 2023-05-12 DIAGNOSIS — R68.81 EARLY SATIETY: ICD-10-CM

## 2023-05-12 DIAGNOSIS — R10.11 ABDOMINAL PAIN, RIGHT UPPER QUADRANT: Primary | ICD-10-CM

## 2023-05-12 DIAGNOSIS — R11.2 NAUSEA AND VOMITING, UNSPECIFIED VOMITING TYPE: ICD-10-CM

## 2023-05-17 ENCOUNTER — HOSPITAL ENCOUNTER (OUTPATIENT)
Dept: NUCLEAR MEDICINE | Age: 43
Discharge: HOME | End: 2023-05-20
Payer: COMMERCIAL

## 2023-05-17 DIAGNOSIS — R11.2 NAUSEA AND VOMITING, UNSPECIFIED VOMITING TYPE: ICD-10-CM

## 2023-05-17 DIAGNOSIS — R68.81 EARLY SATIETY: ICD-10-CM

## 2023-05-17 DIAGNOSIS — R10.11 ABDOMINAL PAIN, RIGHT UPPER QUADRANT: ICD-10-CM

## 2023-05-17 PROCEDURE — 78227 HEPATOBIL SYST IMAGE W/DRUG: CPT

## 2023-05-17 PROCEDURE — A9537 TC99M MEBROFENIN: HCPCS | Performed by: PHYSICIAN ASSISTANT

## 2023-05-17 PROCEDURE — 3430000000 HC RX DIAGNOSTIC RADIOPHARMACEUTICAL: Performed by: PHYSICIAN ASSISTANT

## 2023-05-17 PROCEDURE — 6360000004 HC RX CONTRAST MEDICATION: Performed by: PHYSICIAN ASSISTANT

## 2023-05-17 RX ORDER — KIT FOR THE PREPARATION OF TECHNETIUM TC 99M MEBROFENIN 45 MG/10ML
6 INJECTION, POWDER, LYOPHILIZED, FOR SOLUTION INTRAVENOUS
Status: COMPLETED | OUTPATIENT
Start: 2023-05-17 | End: 2023-05-17

## 2023-05-17 RX ADMIN — MEBROFENIN 6 MILLICURIE: 45 INJECTION, POWDER, LYOPHILIZED, FOR SOLUTION INTRAVENOUS at 09:00

## 2023-05-17 RX ADMIN — SINCALIDE 1.6 MCG: 5 INJECTION, POWDER, LYOPHILIZED, FOR SOLUTION INTRAVENOUS at 10:15

## (undated) DEVICE — UNIVERSAL DRAPES: Brand: MEDLINE INDUSTRIES, INC.

## (undated) DEVICE — SOLUTION IV 1000ML 0.9% SOD CHL

## (undated) DEVICE — INTENDED FOR TISSUE SEPARATION, AND OTHER PROCEDURES THAT REQUIRE A SHARP SURGICAL BLADE TO PUNCTURE OR CUT.: Brand: BARD-PARKER SAFETY BLADES SIZE 10, STERILE

## (undated) DEVICE — BAND RUB 1/8X2.5IN STRL --

## (undated) DEVICE — SUTURE VCRL + 3-0 L27IN ABSRB UD PS-2 L19MM 3/8 CIR PRIM VCP427H

## (undated) DEVICE — DRAPE XR C ARM 41X74IN LF --

## (undated) DEVICE — 3M™ IOBAN™ 2 ANTIMICROBIAL INCISE DRAPE 6650EZ: Brand: IOBAN™ 2

## (undated) DEVICE — CORD,CAUTERY,BIPOLAR,STERILE: Brand: MEDLINE

## (undated) DEVICE — DRILL BIT: Brand: AVIATOR

## (undated) DEVICE — BONE WAX WHITE: Brand: BONE WAX WHITE

## (undated) DEVICE — AGENT HEMSTAT 8ML FLX TIP MTRX + DISP SURGIFLO

## (undated) DEVICE — HEX-LOCKING BLADE ELECTRODE: Brand: EDGE

## (undated) DEVICE — INTENDED FOR TISSUE SEPARATION, AND OTHER PROCEDURES THAT REQUIRE A SHARP SURGICAL BLADE TO PUNCTURE OR CUT.: Brand: BARD-PARKER SAFETY BLADES SIZE 15, STERILE

## (undated) DEVICE — REM POLYHESIVE ADULT PATIENT RETURN ELECTRODE: Brand: VALLEYLAB

## (undated) DEVICE — DERMABOND SKIN ADH 0.7ML -- DERMABOND ADVANCED 12/BX

## (undated) DEVICE — GARMENT,MEDLINE,DVT,INT,CALF,LG, GEN2: Brand: MEDLINE

## (undated) DEVICE — 4.0MM PRECISION ROUND

## (undated) DEVICE — DRAPE SHT 3 QTR PROXIMA 53X77 --

## (undated) DEVICE — NEEDLE SPNL 22GA TRNSLUC YEL WIND HUB ANES FIT STYL DISP

## (undated) DEVICE — 3M™ STERI-DRAPE™ INSTRUMENT POUCH 1018: Brand: STERI-DRAPE™

## (undated) DEVICE — SPONGE TNSL 5/8IN SM XR DTCT -- DBL STRNG

## (undated) DEVICE — CARDINAL HEALTH FLEXIBLE LIGHT HANDLE COVER: Brand: CARDINAL HEALTH

## (undated) DEVICE — DRAPE MICSCP W51XL150IN FOR LEICA M680 WILD OHS

## (undated) DEVICE — ACDF DR VANPELT & LUCAS: Brand: MEDLINE INDUSTRIES, INC.

## (undated) DEVICE — (D)PREP SKN CHLRAPRP APPL 26ML -- CONVERT TO ITEM 371833